# Patient Record
Sex: FEMALE | Race: WHITE | NOT HISPANIC OR LATINO | Employment: FULL TIME | URBAN - METROPOLITAN AREA
[De-identification: names, ages, dates, MRNs, and addresses within clinical notes are randomized per-mention and may not be internally consistent; named-entity substitution may affect disease eponyms.]

---

## 2022-10-20 ENCOUNTER — INITIAL PRENATAL (OUTPATIENT)
Dept: OBSTETRICS AND GYNECOLOGY | Facility: CLINIC | Age: 22
End: 2022-10-20

## 2022-10-20 VITALS
HEIGHT: 60 IN | WEIGHT: 123 LBS | BODY MASS INDEX: 24.15 KG/M2 | SYSTOLIC BLOOD PRESSURE: 106 MMHG | DIASTOLIC BLOOD PRESSURE: 67 MMHG

## 2022-10-20 DIAGNOSIS — B00.9 HERPES SIMPLEX TYPE 2 (HSV-2) INFECTION AFFECTING PREGNANCY, ANTEPARTUM: ICD-10-CM

## 2022-10-20 DIAGNOSIS — Z34.91 PRENATAL CARE IN FIRST TRIMESTER: ICD-10-CM

## 2022-10-20 DIAGNOSIS — Z3A.01 LESS THAN 8 WEEKS GESTATION OF PREGNANCY: Primary | ICD-10-CM

## 2022-10-20 DIAGNOSIS — O98.519 HERPES SIMPLEX TYPE 2 (HSV-2) INFECTION AFFECTING PREGNANCY, ANTEPARTUM: ICD-10-CM

## 2022-10-20 DIAGNOSIS — N89.8 VAGINAL IRRITATION: ICD-10-CM

## 2022-10-20 LAB
B-HCG UR QL: POSITIVE
EXPIRATION DATE: ABNORMAL
INTERNAL NEGATIVE CONTROL: NEGATIVE
INTERNAL POSITIVE CONTROL: POSITIVE
Lab: ABNORMAL

## 2022-10-20 PROCEDURE — 81025 URINE PREGNANCY TEST: CPT | Performed by: STUDENT IN AN ORGANIZED HEALTH CARE EDUCATION/TRAINING PROGRAM

## 2022-10-20 PROCEDURE — 99204 OFFICE O/P NEW MOD 45 MIN: CPT | Performed by: STUDENT IN AN ORGANIZED HEALTH CARE EDUCATION/TRAINING PROGRAM

## 2022-10-20 RX ORDER — PNV NO.95/FERROUS FUM/FOLIC AC 28MG-0.8MG
1 TABLET ORAL DAILY
Qty: 30 TABLET | Refills: 11 | Status: SHIPPED | OUTPATIENT
Start: 2022-10-20

## 2022-10-21 LAB
ABO GROUP BLD: ABNORMAL
AMPHETAMINES UR QL SCN: NEGATIVE NG/ML
BARBITURATES UR QL SCN: NEGATIVE NG/ML
BASOPHILS # BLD AUTO: 0.1 X10E3/UL (ref 0–0.2)
BASOPHILS NFR BLD AUTO: 1 %
BENZODIAZ UR QL SCN: NEGATIVE NG/ML
BLD GP AB SCN SERPL QL: NEGATIVE
BZE UR QL SCN: NEGATIVE NG/ML
CANNABINOIDS UR QL SCN: NEGATIVE NG/ML
CREAT UR-MCNC: 157.8 MG/DL (ref 20–300)
EOSINOPHIL # BLD AUTO: 0 X10E3/UL (ref 0–0.4)
EOSINOPHIL NFR BLD AUTO: 0 %
ERYTHROCYTE [DISTWIDTH] IN BLOOD BY AUTOMATED COUNT: 11.3 % (ref 11.7–15.4)
HBV SURFACE AG SERPL QL IA: NEGATIVE
HCT VFR BLD AUTO: 36 % (ref 34–46.6)
HCV AB S/CO SERPL IA: <0.1 S/CO RATIO (ref 0–0.9)
HGB BLD-MCNC: 12.8 G/DL (ref 11.1–15.9)
HIV 1+2 AB+HIV1 P24 AG SERPL QL IA: NON REACTIVE
IMM GRANULOCYTES # BLD AUTO: 0 X10E3/UL (ref 0–0.1)
IMM GRANULOCYTES NFR BLD AUTO: 0 %
LABORATORY COMMENT REPORT: NORMAL
LYMPHOCYTES # BLD AUTO: 1.9 X10E3/UL (ref 0.7–3.1)
LYMPHOCYTES NFR BLD AUTO: 20 %
MCH RBC QN AUTO: 31.6 PG (ref 26.6–33)
MCHC RBC AUTO-ENTMCNC: 35.6 G/DL (ref 31.5–35.7)
MCV RBC AUTO: 89 FL (ref 79–97)
METHADONE UR QL SCN: NEGATIVE NG/ML
MONOCYTES # BLD AUTO: 0.7 X10E3/UL (ref 0.1–0.9)
MONOCYTES NFR BLD AUTO: 7 %
NEUTROPHILS # BLD AUTO: 7.2 X10E3/UL (ref 1.4–7)
NEUTROPHILS NFR BLD AUTO: 72 %
OPIATES UR QL SCN: NEGATIVE NG/ML
OXYCODONE+OXYMORPHONE UR QL SCN: NEGATIVE NG/ML
PCP UR QL: NEGATIVE NG/ML
PH UR: 6.3 [PH] (ref 4.5–8.9)
PLATELET # BLD AUTO: 271 X10E3/UL (ref 150–450)
PROPOXYPH UR QL SCN: NEGATIVE NG/ML
RBC # BLD AUTO: 4.05 X10E6/UL (ref 3.77–5.28)
RH BLD: POSITIVE
RPR SER QL: NON REACTIVE
RUBV IGG SERPL IA-ACNC: <0.9 INDEX
WBC # BLD AUTO: 9.9 X10E3/UL (ref 3.4–10.8)

## 2022-10-22 LAB
A VAGINAE DNA VAG QL NAA+PROBE: ABNORMAL SCORE
BACTERIA UR CULT: NORMAL
BACTERIA UR CULT: NORMAL
BVAB2 DNA VAG QL NAA+PROBE: ABNORMAL SCORE
C ALBICANS DNA VAG QL NAA+PROBE: NEGATIVE
C GLABRATA DNA VAG QL NAA+PROBE: NEGATIVE
C TRACH DNA VAG QL NAA+PROBE: POSITIVE
MEGA1 DNA VAG QL NAA+PROBE: ABNORMAL SCORE
N GONORRHOEA DNA VAG QL NAA+PROBE: NEGATIVE
T VAGINALIS DNA VAG QL NAA+PROBE: NEGATIVE

## 2022-10-24 DIAGNOSIS — O98.819 CHLAMYDIA INFECTION DURING PREGNANCY: Primary | ICD-10-CM

## 2022-10-24 DIAGNOSIS — B96.89 BV (BACTERIAL VAGINOSIS): ICD-10-CM

## 2022-10-24 DIAGNOSIS — A74.9 CHLAMYDIA INFECTION DURING PREGNANCY: Primary | ICD-10-CM

## 2022-10-24 DIAGNOSIS — N76.0 BV (BACTERIAL VAGINOSIS): ICD-10-CM

## 2022-10-24 RX ORDER — METRONIDAZOLE 500 MG/1
500 TABLET ORAL 2 TIMES DAILY
Qty: 14 TABLET | Refills: 0 | Status: SHIPPED | OUTPATIENT
Start: 2022-10-24 | End: 2022-10-31

## 2022-10-24 RX ORDER — AZITHROMYCIN 500 MG/1
1000 TABLET, FILM COATED ORAL ONCE
Qty: 2 TABLET | Refills: 0 | Status: SHIPPED | OUTPATIENT
Start: 2022-10-24 | End: 2022-10-24

## 2022-10-25 ENCOUNTER — TELEPHONE (OUTPATIENT)
Dept: OBSTETRICS AND GYNECOLOGY | Facility: CLINIC | Age: 22
End: 2022-10-25

## 2022-10-25 NOTE — TELEPHONE ENCOUNTER
Spoke with Masha to let her know that Dr Turner said that your vaginal swab returned positive for chlamydia and bacterial vaginosis. Chlamydia is sexually transmitted and your partner will need to also receive treatment from his physician. You will need to abstain from sexual intercourse for 7 days following treatment of both her and her partner to insure not reinfection occurs. Treatment is with azithromycin 1000 mg once that Dr Turner sent to your [facility] at [address] [place] [address]. Bacterial vaginosis is treated with flagyl 500 mg to be taken twice a day for 7 days. Thank you.    [place] reportable disease form filled out for chlamydia and successfully faxed. Placed in basket to be scanned to chart. Thank you.

## 2022-10-25 NOTE — TELEPHONE ENCOUNTER
----- Message from Nan Turner MD sent at 10/24/2022  1:50 PM EDT -----  Please call and let her know that her vaginal swab returned positive for chlamydia and bacterial vaginosis. Chlamydia is sexually transmitted and her partner will need to also receive treatment from his physician. She will need to abstain from intercourse for 7 days following treatment of both her and her partner to ensure no reinfection occurs. Treatment is with azithromycin 1000 mg once that I have sent to her pharmacy. Bacterial vaginosis is treated with flagyl 500 mg to be taken twice a day for 7 days. Thanks! Please send [place] reportable disease form for chlamydia.

## 2022-11-03 LAB
CLINICAL INFO: NORMAL
ETHNIC BACKGROUND STATED: NORMAL
GENE MUT TESTED BLD/T: NORMAL
GENETIC COUNSELOR:: NORMAL
LAB DIRECTOR NAME PROVIDER: NORMAL
MOL DX INTERP BLD/T QL: NORMAL
REASON FOR REFERRAL (NARRATIVE): NORMAL
REF LAB TEST METHOD: NORMAL
SERVICE CMNT 02-IMP: NORMAL
SPECIMEN SOURCE: NORMAL
TEST PERFORMANCE INFO SPEC: NORMAL

## 2022-11-12 PROBLEM — O98.519 HSV-2 INFECTION COMPLICATING PREGNANCY: Status: ACTIVE | Noted: 2022-11-12

## 2022-11-12 PROBLEM — B00.9 HSV-2 INFECTION COMPLICATING PREGNANCY: Status: ACTIVE | Noted: 2022-11-12

## 2022-11-12 PROBLEM — Z34.90 PREGNANCY: Status: ACTIVE | Noted: 2022-11-12

## 2022-11-17 ENCOUNTER — ROUTINE PRENATAL (OUTPATIENT)
Dept: OBSTETRICS AND GYNECOLOGY | Facility: CLINIC | Age: 22
End: 2022-11-17

## 2022-11-17 VITALS — BODY MASS INDEX: 24.22 KG/M2 | DIASTOLIC BLOOD PRESSURE: 66 MMHG | SYSTOLIC BLOOD PRESSURE: 108 MMHG | WEIGHT: 124 LBS

## 2022-11-17 DIAGNOSIS — Z3A.10 10 WEEKS GESTATION OF PREGNANCY: Primary | ICD-10-CM

## 2022-11-17 DIAGNOSIS — Z34.91 PRENATAL CARE IN FIRST TRIMESTER: ICD-10-CM

## 2022-11-17 LAB
GLUCOSE UR STRIP-MCNC: NEGATIVE MG/DL
PROT UR STRIP-MCNC: ABNORMAL MG/DL

## 2022-11-17 PROCEDURE — 99212 OFFICE O/P EST SF 10 MIN: CPT | Performed by: STUDENT IN AN ORGANIZED HEALTH CARE EDUCATION/TRAINING PROGRAM

## 2022-11-17 NOTE — PROGRESS NOTES
Chief Complaint   Patient presents with   • Routine Prenatal Visit      Masha Shafer is a 22 y.o.  at 10w5d who presents for routine prenatal visit. She reports doing well but is still having nausea but has not had vomited in 4 days. Denies vaginal bleeding or abdominal cramping. Too early for fetal movement.     /66   Wt 56.2 kg (124 lb)   LMP 2022   BMI 24.22 kg/m²    Gen: well appearing, NAD   Abd: gravid, nontender  See OB Flowsheet    ASSESSMENT:   1. IUP at 10w5d   2. Prenatal care in first trimester  3. HSV-2 positive     PLAN:  See updated Problem List   Reviewed expectations of this stage of pregnancy.   First trimester bleeding/pain precautions reviewed.  Patient desires aneuploidy screening and [id number] testing ordered today.   Return in about 4 weeks (around 12/15/2022) for prenatal visit .    Nan Turner MD

## 2022-11-22 LAB
CFDNA.FET/CFDNA.TOTAL SFR FETUS: NORMAL %
CITATION REF LAB TEST: NORMAL
FET 13+18+21+X+Y ANEUP PLAS.CFDNA: NEGATIVE
FET CHR 21 TS PLAS.CFDNA QL: NEGATIVE
FET SEX PLAS.CFDNA DOSAGE CFDNA: NORMAL
FET TS 13 RISK PLAS.CFDNA QL: NEGATIVE
FET TS 18 RISK WBC.DNA+CFDNA QL: NEGATIVE
GA EST FROM CONCEPTION DATE: NORMAL D
GESTATIONAL AGE > 9:: YES
LAB DIRECTOR NAME PROVIDER: NORMAL
LAB DIRECTOR NAME PROVIDER: NORMAL
LABORATORY COMMENT REPORT: NORMAL
LIMITATIONS OF THE TEST: NORMAL
NEGATIVE PREDICTIVE VALUE: NORMAL
NOTE: NORMAL
PERFORMANCE CHARACTERISTICS: NORMAL
POSITIVE PREDICTIVE VALUE: NORMAL
REF LAB TEST METHOD: NORMAL
TEST PERFORMANCE INFO SPEC: NORMAL

## 2022-12-15 ENCOUNTER — ROUTINE PRENATAL (OUTPATIENT)
Dept: OBSTETRICS AND GYNECOLOGY | Facility: CLINIC | Age: 22
End: 2022-12-15
Payer: COMMERCIAL

## 2022-12-15 VITALS — WEIGHT: 127 LBS | DIASTOLIC BLOOD PRESSURE: 70 MMHG | BODY MASS INDEX: 24.8 KG/M2 | SYSTOLIC BLOOD PRESSURE: 115 MMHG

## 2022-12-15 DIAGNOSIS — Z36.86 ENCOUNTER FOR ANTENATAL SCREENING FOR CERVICAL LENGTH: ICD-10-CM

## 2022-12-15 DIAGNOSIS — Z36.89 ENCOUNTER FOR FETAL ANATOMIC SURVEY: ICD-10-CM

## 2022-12-15 DIAGNOSIS — B96.89 BV (BACTERIAL VAGINOSIS): ICD-10-CM

## 2022-12-15 DIAGNOSIS — N76.0 BV (BACTERIAL VAGINOSIS): ICD-10-CM

## 2022-12-15 DIAGNOSIS — Z3A.14 14 WEEKS GESTATION OF PREGNANCY: Primary | ICD-10-CM

## 2022-12-15 DIAGNOSIS — Z34.92 PRENATAL CARE IN SECOND TRIMESTER: ICD-10-CM

## 2022-12-15 DIAGNOSIS — Z86.19 HISTORY OF CHLAMYDIA: ICD-10-CM

## 2022-12-15 LAB
GLUCOSE UR STRIP-MCNC: NEGATIVE MG/DL
PROT UR STRIP-MCNC: ABNORMAL MG/DL

## 2022-12-15 PROCEDURE — 99213 OFFICE O/P EST LOW 20 MIN: CPT | Performed by: STUDENT IN AN ORGANIZED HEALTH CARE EDUCATION/TRAINING PROGRAM

## 2022-12-15 RX ORDER — METRONIDAZOLE 7.5 MG/G
GEL VAGINAL DAILY
Qty: 70 G | Refills: 0 | Status: SHIPPED | OUTPATIENT
Start: 2022-12-15 | End: 2022-12-20

## 2022-12-15 NOTE — PROGRESS NOTES
Chief Complaint   Patient presents with   • Routine Prenatal Visit      Masha Shafer is a 22 y.o.  at 14w5d who presents for routine prenatal visit. She reports she has been having headaches for which she has not yet tried medication. She is also still having vaginal discharge and irritation. Denies abdominal cramping or vaginal bleeding. Too early for fetal movement.     /70   Wt 57.6 kg (127 lb)   LMP 2022   BMI 24.80 kg/m²    Gen: well appearing, NAD   Abd: gravid, nontender  See OB Flowsheet    ASSESSMENT:   1. IUP at 14w5d   2. Prenatal care in second trimester  3. HSV-2 infection  4. History of chlamydia infection  5. Vaginal irritation - likely BV     PLAN:  Problem list reviewed and updated.   Reviewed expectations of this stage of pregnancy.   Second trimester precautions reviewed including  labor precautions, anticipated fetal movements.   Collected GC/CT/Trich probe for test of cure.  Will prescribe Metronidazole 0.75% gel to be inserted vaginally nightly for 5 days for treatment of BV.   Will plan for anatomy survey and cervical length screening at next visit.   Return in about 5 weeks (around 2023) for prenatal visit .     Patient Active Problem List    Diagnosis Date Noted   • Pregnancy 2022   • HSV-2 infection complicating pregnancy 2022       Orders Placed This Encounter   Procedures   • Chlamydia trachomatis, Neisseria gonorrhoeae, Trichomonas vaginalis, PCR - Urine, Urine, Clean Catch     Order Specific Question:   Release to patient     Answer:   Routine Release     Order Specific Question:   [facility] Has the patient fasted?     Answer:   No   • US Ob 14 + Weeks Single or First Gestation     Standing Status:   Future     Standing Expiration Date:   2024     Order Specific Question:   Reason for Exam:     Answer:   anatomy survey     Order Specific Question:   Release to patient     Answer:   Routine Release   • US Ob Transvaginal     Standing  Status:   Future     Standing Expiration Date:   1/2/2024     Order Specific Question:   Reason for Exam:     Answer:   cervical length screening   • POC Urinalysis Dipstick     This is an external result entered through the Results Console.     Order Specific Question:   Release to patient     Answer:   Routine Release     Nan Turner MD

## 2022-12-19 LAB
C TRACH RRNA SPEC QL NAA+PROBE: NEGATIVE
N GONORRHOEA RRNA SPEC QL NAA+PROBE: NEGATIVE
T VAGINALIS RRNA SPEC QL NAA+PROBE: NEGATIVE

## 2023-01-17 ENCOUNTER — ROUTINE PRENATAL (OUTPATIENT)
Dept: OBSTETRICS AND GYNECOLOGY | Facility: CLINIC | Age: 23
End: 2023-01-17
Payer: COMMERCIAL

## 2023-01-17 VITALS — BODY MASS INDEX: 25.78 KG/M2 | WEIGHT: 132 LBS | DIASTOLIC BLOOD PRESSURE: 72 MMHG | SYSTOLIC BLOOD PRESSURE: 109 MMHG

## 2023-01-17 DIAGNOSIS — Z34.92 PRENATAL CARE IN SECOND TRIMESTER: ICD-10-CM

## 2023-01-17 DIAGNOSIS — K59.00 CONSTIPATION, UNSPECIFIED CONSTIPATION TYPE: ICD-10-CM

## 2023-01-17 DIAGNOSIS — Z3A.19 19 WEEKS GESTATION OF PREGNANCY: Primary | ICD-10-CM

## 2023-01-17 LAB
GLUCOSE UR STRIP-MCNC: NEGATIVE MG/DL
PROT UR STRIP-MCNC: NEGATIVE MG/DL

## 2023-01-17 PROCEDURE — 99212 OFFICE O/P EST SF 10 MIN: CPT | Performed by: STUDENT IN AN ORGANIZED HEALTH CARE EDUCATION/TRAINING PROGRAM

## 2023-01-17 NOTE — PROGRESS NOTES
Chief Complaint   Patient presents with   • Routine Prenatal Visit      Masha Shafer is a 22 y.o.  at 19w3d who presents for routine prenatal visit. She reports doing well and has been having constipation. She states that she is normally constipated outside of pregnancy and maybe goes once a week. It is a little worse now that she is pregnant. Denies vaginal bleeding, cramping, contractions, LOF. She reports active fetal movement.     /72   Wt 59.9 kg (132 lb)   LMP 2022   BMI 25.78 kg/m²    Gen: well appearing, NAD   Abd: gravid, nontender  See OB Flowsheet    ASSESSMENT:   1. IUP at 19w3d   2. Prenatal care in second trimester  3. HSV-2 infection- no recent outbreaks, will need suppression therapy at 36 weeks   4. History of chlamydia infection- negative DELICIA on 22   5. Constipation      PLAN:  Problem list reviewed and updated  Reviewed expectations of this stage of pregnancy.   Second trimester precautions reviewed including  labor precautions, anticipated fetal movements.   Discussed normal anatomy survey results and normal cervical length screening with the patient today.   Recommended the patient start a stool softener (colace) that she can get over the counter and increase fiber in her diet to help reduce constipation symptoms. She may also consider Miralax daily.   Return in about 4 weeks (around 2023) for prenatal visit.    Patient Active Problem List    Diagnosis Date Noted   • Pregnancy 2022   • HSV-2 infection complicating pregnancy 2022       Orders Placed This Encounter   Procedures   • POC Urinalysis Dipstick     This is an external result entered through the Results Console.     Order Specific Question:   Release to patient     Answer:   Routine Release     Nan Turner MD

## 2023-02-15 ENCOUNTER — ROUTINE PRENATAL (OUTPATIENT)
Dept: OBSTETRICS AND GYNECOLOGY | Facility: CLINIC | Age: 23
End: 2023-02-15
Payer: COMMERCIAL

## 2023-02-15 VITALS — SYSTOLIC BLOOD PRESSURE: 111 MMHG | WEIGHT: 142 LBS | BODY MASS INDEX: 27.73 KG/M2 | DIASTOLIC BLOOD PRESSURE: 76 MMHG

## 2023-02-15 DIAGNOSIS — Z34.92 PRENATAL CARE IN SECOND TRIMESTER: ICD-10-CM

## 2023-02-15 DIAGNOSIS — Z3A.23 23 WEEKS GESTATION OF PREGNANCY: Primary | ICD-10-CM

## 2023-02-15 DIAGNOSIS — R07.9 CHEST PAIN, UNSPECIFIED TYPE: ICD-10-CM

## 2023-02-15 LAB
GLUCOSE UR STRIP-MCNC: NEGATIVE MG/DL
PROT UR STRIP-MCNC: NEGATIVE MG/DL

## 2023-02-15 PROCEDURE — 99213 OFFICE O/P EST LOW 20 MIN: CPT | Performed by: STUDENT IN AN ORGANIZED HEALTH CARE EDUCATION/TRAINING PROGRAM

## 2023-02-15 NOTE — PROGRESS NOTES
Chief Complaint   Patient presents with   • Routine Prenatal Visit      Masha Shafer is a 22 y.o.  at 23w4d who presents for routine prenatal visit. She reports doing well but did experiencing intermittent sharp chest pain lasting for 3 minutes episodes over 3 hours after eating Mexican last night. It did resolve on its own and she did not experience shortness of breath or other accompanying symptoms.  Denies vaginal bleeding, cramping, contractions, LOF. She reports active fetal movement.     /76   Wt 64.4 kg (142 lb)   LMP 2022   BMI 27.73 kg/m²    Gen: well appearing, NAD   Abd: gravid, nontender  See OB Flowsheet    ASSESSMENT:   1. IUP at 23w4d   2. Prenatal care in second trimester  3. HSV-2 infection- no recent outbreaks, will need suppression at 36 weeks  4. History of chlamydia infection in early pregnancy- negative DELICIA on 22  5. Chest pain     PLAN:  Problem list reviewed and updated.   Reviewed expectations of this stage of pregnancy.   Second trimester precautions reviewed including  labor precautions, anticipated fetal movements.   Will obtain 1h GTT and CBC at next visit.   Addressed excessive weight gain over last 4 weeks of 10 lbs and recommended total of 25-35 lbs in pregnancy. Encouraged small frequent meals and daily exercise.   Discussed that I suspect that her chest pain was related to ingestion but advised to monitor symptoms and if occurs again, contact the clinic. If worsening, she should go to the ER. Physical exam revealed CTAB, no increased work of breathing and regular rate and rhythm. Low suspicion for cardiac or pulmonary cause.   Return in about 4 weeks (around 3/15/2023) for prenatal visit and 1 h GTT .    Patient Active Problem List    Diagnosis Date Noted   • Pregnancy 2022   • HSV-2 infection complicating pregnancy 2022       Orders Placed This Encounter   Procedures   • POC Urinalysis Dipstick     This is an external result entered  through the Results Console.     Order Specific Question:   Release to patient     Answer:   Routine Release     Nan Turner MD

## 2023-03-09 ENCOUNTER — REFERRAL TRIAGE (OUTPATIENT)
Dept: LABOR AND DELIVERY | Facility: HOSPITAL | Age: 23
End: 2023-03-09
Payer: COMMERCIAL

## 2023-03-15 ENCOUNTER — PATIENT OUTREACH (OUTPATIENT)
Dept: LABOR AND DELIVERY | Facility: HOSPITAL | Age: 23
End: 2023-03-15
Payer: COMMERCIAL

## 2023-03-15 NOTE — OUTREACH NOTE
[facility]  Unable to Reach       Questions/Answers    Flowsheet Row Responses   Pending Outreach Confirm Patient Interest   Call Attempt First   Outcome Left message   Next Call Attempt Date 03/17/23          Ladonna AMOS - RN  Maternity Nurse Navigator    3/15/2023, 12:27 EDT

## 2023-03-17 ENCOUNTER — PATIENT OUTREACH (OUTPATIENT)
Dept: LABOR AND DELIVERY | Facility: HOSPITAL | Age: 23
End: 2023-03-17
Payer: COMMERCIAL

## 2023-03-17 NOTE — OUTREACH NOTE
[facility]  Unable to Reach       Questions/Answers    Flowsheet Row Responses   Pending Outreach Confirm Patient Interest   Call Attempt Second   Outcome No answer/busy, MyChart message sent to patient        UTRx2, please reach out for any specific patient needs.     Ladonna Kaiser RN  Maternity Nurse Navigator    3/17/2023, 14:32 EDT

## 2023-03-20 ENCOUNTER — ROUTINE PRENATAL (OUTPATIENT)
Dept: OBSTETRICS AND GYNECOLOGY | Facility: CLINIC | Age: 23
End: 2023-03-20
Payer: COMMERCIAL

## 2023-03-20 VITALS — DIASTOLIC BLOOD PRESSURE: 73 MMHG | WEIGHT: 154.4 LBS | BODY MASS INDEX: 30.15 KG/M2 | SYSTOLIC BLOOD PRESSURE: 116 MMHG

## 2023-03-20 DIAGNOSIS — O99.891 BACK PAIN AFFECTING PREGNANCY IN THIRD TRIMESTER: ICD-10-CM

## 2023-03-20 DIAGNOSIS — M54.9 BACK PAIN AFFECTING PREGNANCY IN THIRD TRIMESTER: ICD-10-CM

## 2023-03-20 DIAGNOSIS — O98.513 HERPES SIMPLEX VIRUS TYPE 2 (HSV-2) INFECTION AFFECTING PREGNANCY IN THIRD TRIMESTER: ICD-10-CM

## 2023-03-20 DIAGNOSIS — R10.2 PELVIC PAIN: ICD-10-CM

## 2023-03-20 DIAGNOSIS — B00.9 HERPES SIMPLEX VIRUS TYPE 2 (HSV-2) INFECTION AFFECTING PREGNANCY IN THIRD TRIMESTER: ICD-10-CM

## 2023-03-20 DIAGNOSIS — Z86.19 HISTORY OF CHLAMYDIA INFECTION: ICD-10-CM

## 2023-03-20 DIAGNOSIS — Z3A.28 28 WEEKS GESTATION OF PREGNANCY: Primary | ICD-10-CM

## 2023-03-20 LAB
GLUCOSE UR STRIP-MCNC: NEGATIVE MG/DL
PROT UR STRIP-MCNC: NEGATIVE MG/DL

## 2023-03-20 PROCEDURE — 99213 OFFICE O/P EST LOW 20 MIN: CPT | Performed by: NURSE PRACTITIONER

## 2023-03-20 NOTE — PROGRESS NOTES
Chief Complaint   Patient presents with   • Routine Prenatal Visit     28w2d OB f/u + 1 hr GTT       OB follow up     Masha Shafer is a 22 y.o.  28w2d being seen today for her obstetrical visit. Fetal movement: normal. Completing 1 hr GTT today. She reports occasional episodes of shakiness, she thinks may be hypoglycemia. C/o pain intermittently at pubic bone. C/o upper back and shoulder pain intermittently.     Review of Systems  Cramping/contractions: denies  Vaginal bleeding: denies  Fetal movement: normal    /73   Wt 70 kg (154 lb 6.4 oz)   LMP 2022   BMI 30.15 kg/m²     Assessment/Plan    Diagnoses and all orders for this visit:    1. 28 weeks gestation of pregnancy (Primary)    2. Herpes simplex virus type 2 (HSV-2) infection affecting pregnancy in third trimester    3. History of chlamydia infection    4. Pelvic pain    5. Back pain affecting pregnancy in third trimester       Reviewed fetal kick counts  Hx chlamydia: Negative DELICIA in December  Hx HSV, no current lesions, planning suppressive therapy at 36 weeks  Discussed S&S hypoglycemia, encouraged meal or snack every 3-4 hours  Completing 1 hr GTT today  Discussed pelvic pain, suggest maternity support belt. Suspect upper back and shoulder pain are R/T compensation for pain in pelvis as well. Tylenol PRN pain  Reviewed this stage of pregnancy  Problem list updated     Follow up in 2 week(s) with Dr. Turner    I spent 22 minutes caring for Masha on this date of service. This time includes time spent by me in the following activities: preparing for the visit, reviewing tests, obtaining and/or reviewing a separately obtained history, performing a medically appropriate examination and/or evaluation, counseling and educating the patient/family/caregiver, ordering medications, tests, or procedures, referring and communicating with other health care professionals and documenting information in the medical record    Joyce Foote,  APRN  3/20/2023  13:19 EDT

## 2023-03-21 RX ORDER — FERROUS SULFATE 325(65) MG
325 TABLET ORAL
Qty: 60 TABLET | Refills: 3 | Status: SHIPPED | OUTPATIENT
Start: 2023-03-21

## 2023-04-04 ENCOUNTER — ROUTINE PRENATAL (OUTPATIENT)
Dept: OBSTETRICS AND GYNECOLOGY | Facility: CLINIC | Age: 23
End: 2023-04-04
Payer: COMMERCIAL

## 2023-04-04 VITALS — BODY MASS INDEX: 30.47 KG/M2 | SYSTOLIC BLOOD PRESSURE: 130 MMHG | WEIGHT: 156 LBS | DIASTOLIC BLOOD PRESSURE: 74 MMHG

## 2023-04-04 DIAGNOSIS — O98.513 HERPES SIMPLEX VIRUS TYPE 2 (HSV-2) INFECTION AFFECTING PREGNANCY IN THIRD TRIMESTER: ICD-10-CM

## 2023-04-04 DIAGNOSIS — O99.019 MATERNAL ANEMIA IN PREGNANCY, ANTEPARTUM: ICD-10-CM

## 2023-04-04 DIAGNOSIS — Z3A.30 30 WEEKS GESTATION OF PREGNANCY: ICD-10-CM

## 2023-04-04 DIAGNOSIS — B00.9 HERPES SIMPLEX VIRUS TYPE 2 (HSV-2) INFECTION AFFECTING PREGNANCY IN THIRD TRIMESTER: ICD-10-CM

## 2023-04-04 DIAGNOSIS — Z34.83 PRENATAL CARE, SUBSEQUENT PREGNANCY IN THIRD TRIMESTER: Primary | ICD-10-CM

## 2023-04-04 LAB
GLUCOSE UR STRIP-MCNC: NEGATIVE MG/DL
PROT UR STRIP-MCNC: NEGATIVE MG/DL

## 2023-04-04 PROCEDURE — 99213 OFFICE O/P EST LOW 20 MIN: CPT | Performed by: OBSTETRICS & GYNECOLOGY

## 2023-04-04 NOTE — PROGRESS NOTES
OB follow up     Masha Shafer is a 22 y.o.  30w3d being seen today for her obstetrical visit.  Patient reports nausea. Fetal movement: normal.She denies any emesis.  She had normal weight progression over the last 2 weeks.  She will get some periodic nausea.  Normal fetal movement is noted.    Her prenatal care is complicated by (and status): History of HSV and planning suppression at 36 weeks.  Maternal anemia    Review of Systems  Cramping/contractions : Negative  Vaginal bleeding: Negative  Fetal movement normal    /74   Wt 70.8 kg (156 lb)   LMP 2022   BMI 30.47 kg/m²     FHT:  138 BPM   Uterine Size: size equals dates       Assessment    Diagnoses and all orders for this visit:    1. Prenatal care, subsequent pregnancy in third trimester (Primary)    2. 30 weeks gestation of pregnancy    3. Herpes simplex virus type 2 (HSV-2) infection affecting pregnancy in third trimester    4. Maternal anemia in pregnancy, antepartum        1) pregnancy at 30w3d         Plan    Reviewed this stage of pregnancy  Problem list updated   Follow up in 2 weeks.  Growth ultrasound on next visit.  We discussed eating more frequent bland meals to keep things on her stomach.  We discussed the group with 8 obstetricians and the call schedule.  She will discuss with Dr. Turner on her next visit the pros and cons of inducing at 39 weeks.    Francisco Mccain MD   2023  10:04 EDT

## 2023-04-18 ENCOUNTER — ROUTINE PRENATAL (OUTPATIENT)
Dept: OBSTETRICS AND GYNECOLOGY | Facility: CLINIC | Age: 23
End: 2023-04-18
Payer: COMMERCIAL

## 2023-04-18 VITALS — WEIGHT: 158 LBS | SYSTOLIC BLOOD PRESSURE: 106 MMHG | DIASTOLIC BLOOD PRESSURE: 73 MMHG | BODY MASS INDEX: 30.86 KG/M2

## 2023-04-18 DIAGNOSIS — O98.519 HERPES SIMPLEX TYPE 2 (HSV-2) INFECTION AFFECTING PREGNANCY, ANTEPARTUM: ICD-10-CM

## 2023-04-18 DIAGNOSIS — B00.9 HERPES SIMPLEX TYPE 2 (HSV-2) INFECTION AFFECTING PREGNANCY, ANTEPARTUM: ICD-10-CM

## 2023-04-18 DIAGNOSIS — Z34.93 PRENATAL CARE IN THIRD TRIMESTER: ICD-10-CM

## 2023-04-18 DIAGNOSIS — Z3A.32 32 WEEKS GESTATION OF PREGNANCY: Primary | ICD-10-CM

## 2023-04-18 DIAGNOSIS — O09.899 RUBELLA NON-IMMUNE STATUS, ANTEPARTUM: ICD-10-CM

## 2023-04-18 DIAGNOSIS — N89.8 VAGINAL ITCHING: ICD-10-CM

## 2023-04-18 DIAGNOSIS — Z28.39 RUBELLA NON-IMMUNE STATUS, ANTEPARTUM: ICD-10-CM

## 2023-04-18 DIAGNOSIS — O99.019 MATERNAL ANEMIA IN PREGNANCY, ANTEPARTUM: ICD-10-CM

## 2023-04-18 LAB
GLUCOSE UR STRIP-MCNC: NEGATIVE MG/DL
PROT UR STRIP-MCNC: ABNORMAL MG/DL

## 2023-04-18 NOTE — PROGRESS NOTES
Chief Complaint   Patient presents with   • Routine Prenatal Visit      Masha Shafer is a 22 y.o.  at 32w3d who presents for routine prenatal visit. She reports doing well but thinks she has a yeast infection and is having vaginal itching with white discharge. Denies vaginal bleeding, cramping, contractions, LOF. She reports active fetal movement.     /73   Wt 71.7 kg (158 lb)   LMP 2022   BMI 30.86 kg/m²    Gen: well appearing, NAD   Abd: gravid, nontender  See OB Flowsheet    ASSESSMENT:   1. IUP at 32w3d   2. Prenatal care in third trimester  3. Rubella non-immune status- will need postpartum vaccination  4. HSV-2 infection- no recent outbreaks, will need to start suppression therapy at 36 weeks   5. History of chlamydia infection in early pregnancy- negative DELICIA on 22  6. Maternal anemia   7. Vaginal itching     PLAN:  Problem list reviewed and updated.   Reviewed expectations of this stage of pregnancy.   Third trimester precautions reviewed including labor signs, monitoring fetal movements.   Will plan to Tdap at next visit.  Prescribed Miconazole 2% vaginal cream to be applied daily for 7 days for suspected yeast infection of the vagina causing vaginal itching.   Discussed ultrasound results that demonstrated normal fetal growth with EFW 2101 g (EFW 55%ile, AC 79%ile), normal JEFFREY 10.2 cm, and cephalic presentation.   Return in about 2 weeks (around 2023) for prenatal visit.    Patient Active Problem List    Diagnosis Date Noted   • Pregnancy 2022   • HSV-2 infection complicating pregnancy 2022       Orders Placed This Encounter   Procedures   • POC Urinalysis Dipstick     This is an external result entered through the Results Console.     Order Specific Question:   Release to patient     Answer:   Routine Release     Nan Turner MD

## 2023-04-29 ENCOUNTER — HOSPITAL ENCOUNTER (EMERGENCY)
Facility: HOSPITAL | Age: 23
Discharge: HOME | End: 2023-04-29
Attending: STUDENT IN AN ORGANIZED HEALTH CARE EDUCATION/TRAINING PROGRAM | Admitting: OBSTETRICS & GYNECOLOGY
Payer: COMMERCIAL

## 2023-04-29 VITALS
WEIGHT: 160 LBS | BODY MASS INDEX: 31.41 KG/M2 | DIASTOLIC BLOOD PRESSURE: 77 MMHG | RESPIRATION RATE: 12 BRPM | OXYGEN SATURATION: 99 % | SYSTOLIC BLOOD PRESSURE: 106 MMHG | HEIGHT: 60 IN | TEMPERATURE: 98 F | HEART RATE: 94 BPM

## 2023-04-29 DIAGNOSIS — K21.9 GASTROESOPHAGEAL REFLUX DISEASE, UNSPECIFIED WHETHER ESOPHAGITIS PRESENT: Primary | ICD-10-CM

## 2023-04-29 DIAGNOSIS — Z34.93 THIRD TRIMESTER PREGNANCY: ICD-10-CM

## 2023-04-29 LAB
ALBUMIN SERPL-MCNC: 3.4 G/DL (ref 3.5–5.2)
ALBUMIN/GLOB SERPL: 1.2 G/DL
ALP SERPL-CCNC: 107 U/L (ref 39–117)
ALT SERPL W P-5'-P-CCNC: 12 U/L (ref 1–33)
ANION GAP SERPL CALCULATED.3IONS-SCNC: 8.4 MMOL/L (ref 5–15)
AST SERPL-CCNC: 11 U/L (ref 1–32)
BASOPHILS # BLD AUTO: 0.04 10*3/MM3 (ref 0–0.2)
BASOPHILS NFR BLD AUTO: 0.4 % (ref 0–1.5)
BILIRUB SERPL-MCNC: 0.4 MG/DL (ref 0–1.2)
BUN SERPL-MCNC: 9 MG/DL (ref 6–20)
BUN/CREAT SERPL: 16.1 (ref 7–25)
CALCIUM SPEC-SCNC: 9.5 MG/DL (ref 8.6–10.5)
CHLORIDE SERPL-SCNC: 105 MMOL/L (ref 98–107)
CO2 SERPL-SCNC: 23.6 MMOL/L (ref 22–29)
CREAT SERPL-MCNC: 0.56 MG/DL (ref 0.57–1)
D DIMER PPP FEU-MCNC: 0.75 MCGFEU/ML (ref 0–0.5)
DEPRECATED RDW RBC AUTO: 44.4 FL (ref 37–54)
EGFRCR SERPLBLD CKD-EPI 2021: 131.7 ML/MIN/1.73
EOSINOPHIL # BLD AUTO: 0.19 10*3/MM3 (ref 0–0.4)
EOSINOPHIL NFR BLD AUTO: 1.7 % (ref 0.3–6.2)
ERYTHROCYTE [DISTWIDTH] IN BLOOD BY AUTOMATED COUNT: 13.5 % (ref 12.3–15.4)
GLOBULIN UR ELPH-MCNC: 2.9 GM/DL
GLUCOSE SERPL-MCNC: 88 MG/DL (ref 65–99)
HCT VFR BLD AUTO: 31.7 % (ref 34–46.6)
HGB BLD-MCNC: 11.4 G/DL (ref 12–15.9)
IMM GRANULOCYTES # BLD AUTO: 0.14 10*3/MM3 (ref 0–0.05)
IMM GRANULOCYTES NFR BLD AUTO: 1.3 % (ref 0–0.5)
LYMPHOCYTES # BLD AUTO: 2.46 10*3/MM3 (ref 0.7–3.1)
LYMPHOCYTES NFR BLD AUTO: 22.1 % (ref 19.6–45.3)
MCH RBC QN AUTO: 32.1 PG (ref 26.6–33)
MCHC RBC AUTO-ENTMCNC: 36 G/DL (ref 31.5–35.7)
MCV RBC AUTO: 89.3 FL (ref 79–97)
MONOCYTES # BLD AUTO: 0.93 10*3/MM3 (ref 0.1–0.9)
MONOCYTES NFR BLD AUTO: 8.4 % (ref 5–12)
NEUTROPHILS NFR BLD AUTO: 66.1 % (ref 42.7–76)
NEUTROPHILS NFR BLD AUTO: 7.37 10*3/MM3 (ref 1.7–7)
NRBC BLD AUTO-RTO: 0 /100 WBC (ref 0–0.2)
NT-PROBNP SERPL-MCNC: 7 PG/ML (ref 0–450)
PLATELET # BLD AUTO: 186 10*3/MM3 (ref 140–450)
PMV BLD AUTO: 10.4 FL (ref 6–12)
POTASSIUM SERPL-SCNC: 4 MMOL/L (ref 3.5–5.2)
PROT SERPL-MCNC: 6.3 G/DL (ref 6–8.5)
RBC # BLD AUTO: 3.55 10*6/MM3 (ref 3.77–5.28)
SODIUM SERPL-SCNC: 137 MMOL/L (ref 136–145)
TROPONIN T SERPL HS-MCNC: <6 NG/L
WBC NRBC COR # BLD: 11.13 10*3/MM3 (ref 3.4–10.8)

## 2023-04-29 PROCEDURE — 84484 ASSAY OF TROPONIN QUANT: CPT | Performed by: EMERGENCY MEDICINE

## 2023-04-29 PROCEDURE — 93010 ELECTROCARDIOGRAM REPORT: CPT | Performed by: INTERNAL MEDICINE

## 2023-04-29 PROCEDURE — 83880 ASSAY OF NATRIURETIC PEPTIDE: CPT | Performed by: EMERGENCY MEDICINE

## 2023-04-29 PROCEDURE — 80053 COMPREHEN METABOLIC PANEL: CPT | Performed by: EMERGENCY MEDICINE

## 2023-04-29 PROCEDURE — 93005 ELECTROCARDIOGRAM TRACING: CPT | Performed by: EMERGENCY MEDICINE

## 2023-04-29 PROCEDURE — 85025 COMPLETE CBC W/AUTO DIFF WBC: CPT | Performed by: EMERGENCY MEDICINE

## 2023-04-29 PROCEDURE — 99202 OFFICE O/P NEW SF 15 MIN: CPT

## 2023-04-29 PROCEDURE — 99283 EMERGENCY DEPT VISIT LOW MDM: CPT | Performed by: OBSTETRICS & GYNECOLOGY

## 2023-04-29 PROCEDURE — 85379 FIBRIN DEGRADATION QUANT: CPT | Performed by: EMERGENCY MEDICINE

## 2023-04-29 PROCEDURE — 59025 FETAL NON-STRESS TEST: CPT | Performed by: OBSTETRICS & GYNECOLOGY

## 2023-04-29 RX ORDER — LIDOCAINE HYDROCHLORIDE 20 MG/ML
15 SOLUTION OROPHARYNGEAL ONCE
Status: COMPLETED | OUTPATIENT
Start: 2023-04-29 | End: 2023-04-29

## 2023-04-29 RX ORDER — ALUMINA, MAGNESIA, AND SIMETHICONE 2400; 2400; 240 MG/30ML; MG/30ML; MG/30ML
15 SUSPENSION ORAL ONCE
Status: COMPLETED | OUTPATIENT
Start: 2023-04-29 | End: 2023-04-29

## 2023-04-29 RX ADMIN — LIDOCAINE HYDROCHLORIDE 15 ML: 20 SOLUTION ORAL at 22:48

## 2023-04-29 RX ADMIN — ALUMINUM HYDROXIDE, MAGNESIUM HYDROXIDE, AND DIMETHICONE 15 ML: 400; 400; 40 SUSPENSION ORAL at 22:48

## 2023-04-30 LAB — QT INTERVAL: 355 MS

## 2023-04-30 NOTE — NURSING NOTE
ED charge nurse made aware of patient's arrival to labor and delivery, chief complaint, and transfer to ER.     Patient transferred to ER in wheelchair and brought to ER triage desk.

## 2023-04-30 NOTE — ED PROVIDER NOTES
" EMERGENCY DEPARTMENT ENCOUNTER    Room Number:    Date seen:  2023  PCP: Provider, No Known  Historian: Patient      HPI:  Chief Complaint: \"I think I've got really bad indigestion\"    A complete HPI/ROS/PMH/PSH/SH/FH are unobtainable due to: N/A    Context: Masha Shafer is a 22 y.o. female who presents to the ED c/o epigastric pain rating into her chest for the past few days.  She has had some nausea.  Symptoms are worse when she is supine and when she eats or foods.  She also reports that metallic taste at times.  Mild cough/congestion which she attributes to allergies.  No fevers or chills.  No vomiting or diarrhea.  No fevers or chills.    , approximately 34 weeks pregnant.  She was seen in the OB ED prior and was cleared from their standpoint.    Additional sources:  - Discussed/ obtained information from independent historians: No    - External (non-ED) record review: She is otherwise healthy.    - Chronic or social conditions impacting care: None of which I am aware.      PAST MEDICAL HISTORY  Active Ambulatory Problems     Diagnosis Date Noted   • Pregnancy 2022   • HSV-2 infection complicating pregnancy 2022     Resolved Ambulatory Problems     Diagnosis Date Noted   • No Resolved Ambulatory Problems     Past Medical History:   Diagnosis Date   • Herpes          PAST SURGICAL HISTORY  No past surgical history on file.      FAMILY HISTORY  No family history on file.      SOCIAL HISTORY  Social History     Socioeconomic History   • Marital status: Single   Tobacco Use   • Smoking status: Never   Vaping Use   • Vaping Use: Former   • Substances: Nicotine   • Devices: Disposable   Substance and Sexual Activity   • Alcohol use: Not Currently   • Drug use: Never   • Sexual activity: Yes     Partners: Male         ALLERGIES  Patient has no known allergies.        REVIEW OF SYSTEMS  Review of Systems   Constitutional: Negative for chills and fever.   Respiratory: Positive for cough " and shortness of breath.    Cardiovascular: Positive for chest pain.   Gastrointestinal: Positive for abdominal pain and nausea. Negative for diarrhea and vomiting.            PHYSICAL EXAM  ED Triage Vitals [04/29/23 2106]   Temp Heart Rate Resp BP SpO2   97.9 °F (36.6 °C) 104 16 120/76 100 %      Temp src Heart Rate Source Patient Position BP Location FiO2 (%)   Oral Monitor -- -- --       Physical Exam      Physical Exam   Constitutional: Pt. is oriented to person, place, and time.  She is well-developed, well-nourished, and in no distress.    HENT: Normocephalic and atraumatic. Pupils are equal, round, and reactive to light.   Neck: Normal range of motion. Neck supple. No JVD present. No tracheal deviation present.   Cardiovascular: Normal rate, regular rhythm and normal heart sounds.   Pulmonary/Chest: Effort normal and breath sounds normal. No stridor. No respiratory distress. No wheezes, no rales.   Abdominal: Soft.  No distension. There is no tenderness. There is no rebound and no guarding.  Gravid uterus.  Musculoskeletal: No edema, tenderness or deformity.   Neurological: She is alert and oriented to person, place, and time.  She has no focal neurologic deficits.  Skin: Skin is warm and dry. Pt. is not diaphoretic.  Psychiatric: Mood, affect normal.  She is pleasant and cooperative.  Nursing note and vitals reviewed.            LAB RESULTS  Recent Results (from the past 24 hour(s))   Comprehensive Metabolic Panel    Collection Time: 04/29/23  9:55 PM    Specimen: Blood   Result Value Ref Range    Glucose 88 65 - 99 mg/dL    BUN 9 6 - 20 mg/dL    Creatinine 0.56 (L) 0.57 - 1.00 mg/dL    Sodium 137 136 - 145 mmol/L    Potassium 4.0 3.5 - 5.2 mmol/L    Chloride 105 98 - 107 mmol/L    CO2 23.6 22.0 - 29.0 mmol/L    Calcium 9.5 8.6 - 10.5 mg/dL    Total Protein 6.3 6.0 - 8.5 g/dL    Albumin 3.4 (L) 3.5 - 5.2 g/dL    ALT (SGPT) 12 1 - 33 U/L    AST (SGOT) 11 1 - 32 U/L    Alkaline Phosphatase 107 39 - 117 U/L     Total Bilirubin 0.4 0.0 - 1.2 mg/dL    Globulin 2.9 gm/dL    A/G Ratio 1.2 g/dL    BUN/Creatinine Ratio 16.1 7.0 - 25.0    Anion Gap 8.4 5.0 - 15.0 mmol/L    eGFR 131.7 >60.0 mL/min/1.73   BNP    Collection Time: 04/29/23  9:55 PM    Specimen: Blood   Result Value Ref Range    proBNP 7.0 0.0 - 450.0 pg/mL   Single High Sensitivity Troponin T    Collection Time: 04/29/23  9:55 PM    Specimen: Blood   Result Value Ref Range    HS Troponin T <6 <10 ng/L   D-dimer, Quantitative    Collection Time: 04/29/23  9:55 PM    Specimen: Blood   Result Value Ref Range    D-Dimer, Quantitative 0.75 (H) 0.00 - 0.50 MCGFEU/mL   CBC Auto Differential    Collection Time: 04/29/23  9:55 PM    Specimen: Blood   Result Value Ref Range    WBC 11.13 (H) 3.40 - 10.80 10*3/mm3    RBC 3.55 (L) 3.77 - 5.28 10*6/mm3    Hemoglobin 11.4 (L) 12.0 - 15.9 g/dL    Hematocrit 31.7 (L) 34.0 - 46.6 %    MCV 89.3 79.0 - 97.0 fL    MCH 32.1 26.6 - 33.0 pg    MCHC 36.0 (H) 31.5 - 35.7 g/dL    RDW 13.5 12.3 - 15.4 %    RDW-SD 44.4 37.0 - 54.0 fl    MPV 10.4 6.0 - 12.0 fL    Platelets 186 140 - 450 10*3/mm3    Neutrophil % 66.1 42.7 - 76.0 %    Lymphocyte % 22.1 19.6 - 45.3 %    Monocyte % 8.4 5.0 - 12.0 %    Eosinophil % 1.7 0.3 - 6.2 %    Basophil % 0.4 0.0 - 1.5 %    Immature Grans % 1.3 (H) 0.0 - 0.5 %    Neutrophils, Absolute 7.37 (H) 1.70 - 7.00 10*3/mm3    Lymphocytes, Absolute 2.46 0.70 - 3.10 10*3/mm3    Monocytes, Absolute 0.93 (H) 0.10 - 0.90 10*3/mm3    Eosinophils, Absolute 0.19 0.00 - 0.40 10*3/mm3    Basophils, Absolute 0.04 0.00 - 0.20 10*3/mm3    Immature Grans, Absolute 0.14 (H) 0.00 - 0.05 10*3/mm3    nRBC 0.0 0.0 - 0.2 /100 WBC   ECG 12 Lead Chest Pain    Collection Time: 04/29/23 10:07 PM   Result Value Ref Range    QT Interval 355 ms       Ordered the above labs and reviewed the results.        RADIOLOGY  No Radiology Exams Resulted Within Past 24 Hours    Ordered the above noted radiological studies. Reviewed by me in PACS.         PROCEDURES  Procedures      MEDICATIONS GIVEN IN ER  Medications   aluminum-magnesium hydroxide-simethicone (MAALOX MAX) 400-400-40 MG/5ML suspension 15 mL (15 mL Oral Given 4/29/23 2248)   Lidocaine Viscous HCl (XYLOCAINE) 2 % solution 15 mL (15 mL Mouth/Throat Given 4/29/23 2248)             MEDICAL DECISION MAKING, PROGRESS, and CONSULTS    All labs have been independently reviewed by me.  All radiology studies have been reviewed by me and discussed with radiologist dictating the report.   EKG's independently viewed and interpreted by me.  Discussion below represents my analysis of pertinent findings related to patient's condition, differential diagnosis, treatment plan and final disposition.      Orders placed during this visit:  Orders Placed This Encounter   Procedures   • Comprehensive Metabolic Panel   • BNP   • Single High Sensitivity Troponin T   • D-dimer, Quantitative   • CBC Auto Differential   • Vital Signs   • Obtain Fetal Heart Rate - For Gestational Age <24 weeks   • Continuous Uterine Monitoring - For Gestational Age >24 weeks   • Vaginal Exam   • Pulse Oximetry, Spot   • ECG 12 Lead Chest Pain   • Fetal Nonstress Test   • Transfer Patient   • CBC & Differential       Additional orders considered but not ordered:  N/A    Differential diagnosis:  Differential diagnosis for chest pain/dyspnea includes but is not limited to:  Muscle strain, costochondritis, myositis, pleurisy, rib fracture, intercostal neuritis, herpes zoster, tumor, myocardial infarction, coronary syndrome, unstable angina, angina, aortic dissection, mitral valve prolapse, pericarditis, palpitations, pulmonary embolus, pneumonia, pneumothorax, lung cancer, GERD, esophagitis, esophageal spasm      Independent interpretation of labs, radiology studies, and discussions with consultants:  ED Course as of 04/29/23 2314   Sat Apr 29, 2023 2210 EKG performed at 2207 and interpreted by me shows normal sinus rhythm without abnormality  7 bpm.  ID intervals, QRS complexes and ST-T segments are unremarkable.  No prior EKGs are available for comparison. [WC]   2219 Symptoms sound GI in nature-I have ordered a GI cocktail for her.  Check CBC, CMP, troponin, BNP and D-dimer. [WC]   2232 Pt. Has ZERO YEARS criteria, PE is ruled out given dimer < 1000ng/ml [WC]   2233 HS Troponin T: <6 [WC]   2233 proBNP: 7.0 [WC]   2311 Labs, EKG and imaging as above.  My clinical suspicion for a serious underlying pulmonary or cardiac etiology is low.  There is no evidence to suggest pulmonary embolism, aortic dissection or acute coronary syndrome.  No evidence of pneumonia/CHF/COPD exacerbation that would require inpatient admission.  The patient was advised to return to the emergency department should the symptoms worsen or for any new concerns.  The patient can be safely followed as an outpatient for further workup as indicated.   [WC]   2312 Patient feels much better after GI cocktail. [WC]      ED Course User Index  [WC] Mario Fofana MD              Appropriate PPE was worn by myself and the patient throughout our entire interaction.    DIAGNOSIS  Final diagnoses:   Gastroesophageal reflux disease, unspecified whether esophagitis present   Third trimester pregnancy         DISPOSITION  Discharged            Latest Documented Vital Signs:  As of 23:14 EDT  BP- 106/77 HR- 94 Temp- 98 °F (36.7 °C) (Tympanic) O2 sat- 99%        --    Please note that portions of this were completed with a voice recognition program.       Note Disclaimer: At [facility], we believe that sharing information builds trust and better relationships. You are receiving this note because you are receiving care at [facility] or recently visited. It is possible you will see health information before a provider has talked with you about it. This kind of information can be easy to misunderstand. To help you fully understand what it means for your health, we urge you to discuss this  note with your provider.           Mario Fofana MD  04/29/23 4586

## 2023-04-30 NOTE — OBED NOTES
[facility] Note OB        Patient Name: Masha Shafer  YOB: 2000  MRN: [id number]  Admission Date: 2023  8:53 PM  Date of Service: 2023    Chief Complaint: Chest Pain ([facility]-patient came in through [facility] with complaints of chest pain and shortness of breath that started yesterday afternoon. Patient reports mild cramping, denies LOF/vaginal bleeding, and endorses +FM. )        Subjective     Masha Shafer is a 22 y.o. female  at 34w0d with Estimated Date of Delivery: 6/10/23 who presents with the chief complaint listed above.  She sees Nan Turner MD for her prenatal care. Her pregnancy has been complicated by: HSV-2.  Patient presenting to OB ED at this time complaining of chest pain and shortness of breath that began yesterday afternoon.  She reports the pain is in the center of her chest.  She also reports feeling shortness of breath at times.  She has an occasional cramp but is not herb.  She feels normal fetal movement and denies vaginal bleeding or loss of fluid.            Objective   Patient Active Problem List    Diagnosis    • Pregnancy [Z34.90]    • HSV-2 infection complicating pregnancy [O98.519, B00.9]         OB History    Para Term  AB Living   1 0 0 0 0 0   SAB IAB Ectopic Molar Multiple Live Births   0 0 0 0 0 0      # Outcome Date GA Lbr Luis/2nd Weight Sex Delivery Anes PTL Lv   1 Current                 No past medical history on file.    No past surgical history on file.    No current facility-administered medications on file prior to encounter.     Current Outpatient Medications on File Prior to Encounter   Medication Sig Dispense Refill   • ferrous sulfate 325 (65 FE) MG tablet Take 1 tablet by mouth Daily With Breakfast. 60 tablet 3   • Prenatal Vit-Fe Fumarate-FA (Prenatal Vitamin) 27-0.8 MG tablet Take 1 tablet by mouth Daily. 30 tablet 11       No Known Allergies    No family history on file.    Social History     Socioeconomic  History   • Marital status: Single   Tobacco Use   • Smoking status: Never   Vaping Use   • Vaping Use: Former   • Substances: Nicotine   • Devices: Disposable   Substance and Sexual Activity   • Alcohol use: Not Currently   • Drug use: Never   • Sexual activity: Yes     Partners: Male           Review of Systems   Constitutional: Negative for chills, fatigue and fever.   HENT: Negative.    Eyes: Negative for photophobia and visual disturbance.   Respiratory: Positive for shortness of breath. Negative for cough and chest tightness.    Cardiovascular: Positive for chest pain. Negative for leg swelling.   Gastrointestinal: Negative for abdominal pain, diarrhea, nausea and vomiting.   Genitourinary: Negative for dysuria, flank pain, hematuria, pelvic pain, vaginal bleeding and vaginal discharge.   Musculoskeletal: Negative for back pain.   Neurological: Negative for dizziness, seizures, weakness and headaches.          PHYSICAL EXAM:      VITAL SIGNS:  Vitals:    04/29/23 2106   BP: 120/76   Pulse: 104   Resp: 16   Temp: 97.9 °F (36.6 °C)   TempSrc: Oral   SpO2: 100%        FHT'S:                   Baseline:  135-140 BPM  Variability:  Moderate = 6 - 25 BPM  Accelerations:  15 x 15 accelerations present     Decelerations:  absent  Contractions:   absent     Interpretation:   Reactive NST, CAT 1 tracing        PHYSICAL EXAM:    General: well developed; well nourished  no acute distress   Heart:  Deferred to main ED   Lungs: breathing is unlabored     Abdomen: soft, non-tender; no masses  no umbilical or inguinal hernias are present  no hepato-splenomegaly       Cervix: was not examined      Contractions: none        Extremities: peripheral pulses normal, no pedal edema, no clubbing or cyanosis      LABS AND TESTING ORDERED:  1. Uterine and fetal monitoring  2. Urinalysis  3. NST  4. Will transfer to main ER for further evaluation of chest pain    LAB RESULTS:    No results found for this or any previous visit (from the  past 24 hour(s)).    Lab Results   Component Value Date    ABO O 10/20/2022    RH Positive 10/20/2022       No results found for: STREPGPB              Assessment & Plan     ASSESSMENT/PLAN:  Masha Shafer is a 22 y.o. female  at 34w0d who presented with complaint of chest pain and feelings at times of shortness of breath over the last 30 hours.  Her oxygen saturation was noted to be 99 to 100% on room air.  Evaluation of the fetus reveals reactive NST and category 1 tracing and she feels normal fetal movement.  No loss of fluid or vaginal bleeding..  Her blood pressure is normal she denies visual disturbance,  headache, right upper quadrant pain, or increase in edema.  From an obstetrical standpoint she demonstrates no evidence of preeclampsia and is noted to have normal oxygen saturations at this time we will refer to the main ER for further evaluation of chest pain as directed by the ER physician.          Final Impression:  • Pregnancy at 34w0d  •   • Reactive NST.  CAT 1 tracing  • Chest pain of unknown etiology will will refer to main ER for further evaluation.  • Complains of shortness of breath however oxygen saturation is 99% 100% on room air  • No evidence of preeclampsia with normal blood pressure.  No headache, no visual disturbance, no right upper quadrant pain, and no significant edema.  • Maternal vital signs were reviewed and were normal               Vitals:    23 2106   BP: 120/76   Pulse: 104   Resp: 16   Temp: 97.9 °F (36.6 °C)   TempSrc: Oral   SpO2: 100%   •       • No results found for: STREPGPB  Lab Results   Component Value Date    ABO O 10/20/2022    RH Positive 10/20/2022   •   PLAN:  · Transfer to main ER for further evaluation of chest pain        I have spent 20 minutes including face to face time with the patient, greater than 50% in discussion of the diagnosis (counseling) and/or coordination of care.     Darin Kaplan MD  2023  21:15 EDT  OB  Hospitalist  Phone:    [phone]

## 2023-05-02 ENCOUNTER — ROUTINE PRENATAL (OUTPATIENT)
Dept: OBSTETRICS AND GYNECOLOGY | Facility: CLINIC | Age: 23
End: 2023-05-02
Payer: COMMERCIAL

## 2023-05-02 VITALS — SYSTOLIC BLOOD PRESSURE: 110 MMHG | DIASTOLIC BLOOD PRESSURE: 74 MMHG | WEIGHT: 159 LBS | BODY MASS INDEX: 31.05 KG/M2

## 2023-05-02 DIAGNOSIS — O99.019 MATERNAL ANEMIA IN PREGNANCY, ANTEPARTUM: ICD-10-CM

## 2023-05-02 DIAGNOSIS — O09.899 RUBELLA NON-IMMUNE STATUS, ANTEPARTUM: ICD-10-CM

## 2023-05-02 DIAGNOSIS — K30 INDIGESTION: ICD-10-CM

## 2023-05-02 DIAGNOSIS — B00.9 HERPES SIMPLEX TYPE 2 (HSV-2) INFECTION AFFECTING PREGNANCY, ANTEPARTUM: ICD-10-CM

## 2023-05-02 DIAGNOSIS — Z28.39 RUBELLA NON-IMMUNE STATUS, ANTEPARTUM: ICD-10-CM

## 2023-05-02 DIAGNOSIS — Z3A.34 34 WEEKS GESTATION OF PREGNANCY: Primary | ICD-10-CM

## 2023-05-02 DIAGNOSIS — O98.519 HERPES SIMPLEX TYPE 2 (HSV-2) INFECTION AFFECTING PREGNANCY, ANTEPARTUM: ICD-10-CM

## 2023-05-02 DIAGNOSIS — Z34.93 PRENATAL CARE IN THIRD TRIMESTER: ICD-10-CM

## 2023-05-02 LAB
GLUCOSE UR STRIP-MCNC: NEGATIVE MG/DL
PROT UR STRIP-MCNC: NEGATIVE MG/DL

## 2023-05-02 RX ORDER — PANTOPRAZOLE SODIUM 20 MG/1
20 TABLET, DELAYED RELEASE ORAL DAILY
Qty: 30 TABLET | Refills: 1 | Status: SHIPPED | OUTPATIENT
Start: 2023-05-02 | End: 2024-05-01

## 2023-05-02 RX ORDER — ACYCLOVIR 400 MG/1
400 TABLET ORAL 3 TIMES DAILY
Qty: 90 TABLET | Refills: 1 | Status: SHIPPED | OUTPATIENT
Start: 2023-05-02 | End: 2023-06-01

## 2023-05-02 NOTE — PROGRESS NOTES
Chief Complaint   Patient presents with   • Routine Prenatal Visit      Masha Shafer is a 22 y.o.  at 34w3d who presents for routine prenatal visit. She was recently seen in the ED on 23 for chest pain that was worked up and showed no concern for cardiac or pulmonary nature and improved with GI cocktail. She reports that it has been better but she again had stomach and chest pain yesterday that was more mild than what brought her to the ED. She also reports recent cold last week with congestion, running nose, ear pain bilaterally, and fluid in her ears that has been improving. Denies vaginal bleeding, cramping, contractions, LOF. She reports active fetal movement.     /74   Wt 72.1 kg (159 lb)   LMP 2022   BMI 31.05 kg/m²    Gen: well appearing, NAD   Abd: gravid, nontender  See OB Flowsheet    ASSESSMENT:   1. IUP at 34w3d   2. Prenatal care in third trimester  3. Rubella non-immune status  4. HSV-2 infection- no recent outbreaks, will prescribe Acyclovir 400 mg TID to start at 36 weeks  5. History of chlamydia infection in early pregnancy with negative DELICIA  6. Maternal anemia- recent Hgb 11.4 on 23   7. Indigestion     PLAN:  Problem list reviewed and updated.   Reviewed expectations of this stage of pregnancy.   Third trimester precautions reviewed including labor signs, monitoring fetal movements.   Will obtain GBS swab at next visit.   Prescribed Protonix 20 mg daily for management of indigestion symptoms.   Recommend OTC zyrtec, Allegra, Claritin, benadryl, Mucinex for management of cold and allergy symptoms.    Return in about 2 weeks (around 2023) for prenatal visit .    Patient Active Problem List    Diagnosis Date Noted   • Pregnancy 2022   • HSV-2 infection complicating pregnancy 2022       Orders Placed This Encounter   Procedures   • POC Urinalysis Dipstick     This is an external result entered through the Results Console.     Order Specific Question:    Release to patient     Answer:   Routine Release     Nan Turner MD

## 2023-05-16 ENCOUNTER — ROUTINE PRENATAL (OUTPATIENT)
Dept: OBSTETRICS AND GYNECOLOGY | Facility: CLINIC | Age: 23
End: 2023-05-16

## 2023-05-16 VITALS — SYSTOLIC BLOOD PRESSURE: 118 MMHG | WEIGHT: 162 LBS | DIASTOLIC BLOOD PRESSURE: 79 MMHG | BODY MASS INDEX: 31.64 KG/M2

## 2023-05-16 DIAGNOSIS — O09.899 RUBELLA NON-IMMUNE STATUS, ANTEPARTUM: ICD-10-CM

## 2023-05-16 DIAGNOSIS — O99.019 MATERNAL ANEMIA IN PREGNANCY, ANTEPARTUM: ICD-10-CM

## 2023-05-16 DIAGNOSIS — N89.8 VAGINAL DISCHARGE DURING PREGNANCY IN THIRD TRIMESTER: ICD-10-CM

## 2023-05-16 DIAGNOSIS — Z28.39 RUBELLA NON-IMMUNE STATUS, ANTEPARTUM: ICD-10-CM

## 2023-05-16 DIAGNOSIS — B00.9 HERPES SIMPLEX TYPE 2 (HSV-2) INFECTION AFFECTING PREGNANCY, ANTEPARTUM: ICD-10-CM

## 2023-05-16 DIAGNOSIS — Z3A.36 36 WEEKS GESTATION OF PREGNANCY: Primary | ICD-10-CM

## 2023-05-16 DIAGNOSIS — O98.519 HERPES SIMPLEX TYPE 2 (HSV-2) INFECTION AFFECTING PREGNANCY, ANTEPARTUM: ICD-10-CM

## 2023-05-16 DIAGNOSIS — O26.893 VAGINAL DISCHARGE DURING PREGNANCY IN THIRD TRIMESTER: ICD-10-CM

## 2023-05-16 DIAGNOSIS — Z34.93 PRENATAL CARE IN THIRD TRIMESTER: ICD-10-CM

## 2023-05-16 LAB
GLUCOSE UR STRIP-MCNC: NEGATIVE MG/DL
PROT UR STRIP-MCNC: NEGATIVE MG/DL

## 2023-05-16 NOTE — PROGRESS NOTES
Chief Complaint   Patient presents with   • Routine Prenatal Visit      Masha Shafer is a 22 y.o.  at 36w3d who presents for routine prenatal visit. She reports having irritating, vaginal discharge for about a week and there is an area of irritation on the outside on the left side. Denies burning in the area.  Denies vaginal bleeding, cramping, contractions, LOF. She reports active fetal movement.     /79   Wt 73.5 kg (162 lb)   LMP 2022   BMI 31.64 kg/m²    Gen: well appearing, NAD   Abd: gravid, nontender  Pelvis: normal external female genitalia, normal vulva with slight erythema lateral to the clitoral cifuentes on the left labia minora, no evidence of herpetic lesions, SVE 0/20/-3   See OB Flowsheet    ASSESSMENT:   1. IUP at 36w3d   2. Prenatal care in third trimester  3. Rubella non-immune status- will need postpartum vaccination   4. HSV-2 infection- no recent outbreaks, on Acyclovir 400 mg TID for suppression therapy   5. History of chlamydia infection in early pregnancy with negative DELICIA  6. Maternal anemia- recent Hgb 11.4 on 23  7. Vaginal discharge     PLAN:  Problem list reviewed and updated.   Reviewed expectations of this stage of pregnancy.   Third trimester precautions reviewed including labor signs, monitoring fetal movements.   Collected NuSwab BV/Candida to evaluate vaginal discharge.  Collected GBS swab today.   Return in about 1 week (around 2023) for prenatal visit.    Patient Active Problem List    Diagnosis Date Noted   • Pregnancy 2022   • HSV-2 infection complicating pregnancy 2022       Orders Placed This Encounter   Procedures   • Group B Streptococcus Culture - Swab, Vaginal/Rectum     Order Specific Question:   Release to patient     Answer:   Routine Release   • NuSwab BV & Candida - Swab, Vagina     Order Specific Question:   Release to patient     Answer:   Routine Release   • POC Urinalysis Dipstick     This is an external result entered  through the Results Console.     Order Specific Question:   Release to patient     Answer:   Routine Release     Nan Turner MD

## 2023-05-18 LAB
A VAGINAE DNA VAG QL NAA+PROBE: ABNORMAL SCORE
BVAB2 DNA VAG QL NAA+PROBE: ABNORMAL SCORE
C ALBICANS DNA VAG QL NAA+PROBE: NEGATIVE
C GLABRATA DNA VAG QL NAA+PROBE: NEGATIVE
MEGA1 DNA VAG QL NAA+PROBE: ABNORMAL SCORE

## 2023-05-19 DIAGNOSIS — N76.0 BV (BACTERIAL VAGINOSIS): Primary | ICD-10-CM

## 2023-05-19 DIAGNOSIS — B96.89 BV (BACTERIAL VAGINOSIS): Primary | ICD-10-CM

## 2023-05-20 LAB — B-HEM STREP SPEC QL CULT: NEGATIVE

## 2023-05-23 ENCOUNTER — ROUTINE PRENATAL (OUTPATIENT)
Dept: OBSTETRICS AND GYNECOLOGY | Facility: CLINIC | Age: 23
End: 2023-05-23
Payer: COMMERCIAL

## 2023-05-23 VITALS — DIASTOLIC BLOOD PRESSURE: 75 MMHG | WEIGHT: 164 LBS | BODY MASS INDEX: 32.03 KG/M2 | SYSTOLIC BLOOD PRESSURE: 119 MMHG

## 2023-05-23 DIAGNOSIS — Z3A.37 37 WEEKS GESTATION OF PREGNANCY: Primary | ICD-10-CM

## 2023-05-23 DIAGNOSIS — O99.019 MATERNAL ANEMIA IN PREGNANCY, ANTEPARTUM: ICD-10-CM

## 2023-05-23 DIAGNOSIS — E66.9 OBESITY (BMI 30-39.9): ICD-10-CM

## 2023-05-23 DIAGNOSIS — Z34.93 PRENATAL CARE IN THIRD TRIMESTER: ICD-10-CM

## 2023-05-23 DIAGNOSIS — O98.519 HERPES SIMPLEX TYPE 2 (HSV-2) INFECTION AFFECTING PREGNANCY, ANTEPARTUM: ICD-10-CM

## 2023-05-23 DIAGNOSIS — B00.9 HERPES SIMPLEX TYPE 2 (HSV-2) INFECTION AFFECTING PREGNANCY, ANTEPARTUM: ICD-10-CM

## 2023-05-23 DIAGNOSIS — Z28.39 RUBELLA NON-IMMUNE STATUS, ANTEPARTUM: ICD-10-CM

## 2023-05-23 DIAGNOSIS — O09.899 RUBELLA NON-IMMUNE STATUS, ANTEPARTUM: ICD-10-CM

## 2023-05-23 LAB
GLUCOSE UR STRIP-MCNC: NEGATIVE MG/DL
PROT UR STRIP-MCNC: NEGATIVE MG/DL

## 2023-05-23 NOTE — PROGRESS NOTES
Chief Complaint   Patient presents with   • Routine Prenatal Visit      Masha Shafer is a 22 y.o.  at 37w3d who presents for routine prenatal visit. She reports that her vulvar irritation on the left side has improved and she denies burning or itching in the area. She denies any HSV lesions. Denies vaginal bleeding, cramping, contractions, LOF. She reports active fetal movement.     /75   Wt 74.4 kg (164 lb)   LMP 2022   BMI 32.03 kg/m²    Gen: well appearing, NAD   Abd: gravid, nontender  SVE: 0/50/-3, normal external female genitalia, normal vulva without herpetic lesions noted.   See OB Flowsheet    ASSESSMENT:   1. IUP at 37w3d   2. Prenatal care in third trimester  3. Rubella non-immune status- will need postpartum vaccination  4. HSV-2 infection- no recent outbreaks, on Acyclovir 400 mg TID for suppression therapy   5. History of chlamydia infection in early pregnancy with negative DELICIA  6. Maternal anemia- Hgb 11.4 on 23   7. Recent BV - prescribed Metronidazole 1.3% gel on 23 for treatment   8. Obesity- BMI 31    PLAN:  Problem list reviewed and updated  Reviewed expectations of this stage of pregnancy.   Third trimester precautions reviewed including labor signs, monitoring fetal movements.   Will obtain growth ultrasound at next visit given obesity and maternal anemia.   Aware of negative GBS swab.   Return in about 1 week (around 2023) for prenatal visit and growth ultrasound .    Patient Active Problem List    Diagnosis Date Noted   •  (spontaneous vaginal delivery) 2023   • Pregnant 06/10/2023   • Supervision of normal first pregnancy in third trimester 06/10/2023   • Pregnancy 2022   • HSV-2 infection complicating pregnancy 2022       Orders Placed This Encounter   Procedures   • POC Urinalysis Dipstick     Order Specific Question:   Release to patient     Answer:   Routine Release     Nan Turner MD

## 2023-05-30 ENCOUNTER — ROUTINE PRENATAL (OUTPATIENT)
Dept: OBSTETRICS AND GYNECOLOGY | Facility: CLINIC | Age: 23
End: 2023-05-30
Payer: COMMERCIAL

## 2023-05-30 VITALS — DIASTOLIC BLOOD PRESSURE: 84 MMHG | BODY MASS INDEX: 31.83 KG/M2 | SYSTOLIC BLOOD PRESSURE: 120 MMHG | WEIGHT: 163 LBS

## 2023-05-30 DIAGNOSIS — O98.519 HERPES SIMPLEX TYPE 2 (HSV-2) INFECTION AFFECTING PREGNANCY, ANTEPARTUM: ICD-10-CM

## 2023-05-30 DIAGNOSIS — E66.9 OBESITY (BMI 30-39.9): ICD-10-CM

## 2023-05-30 DIAGNOSIS — Z34.93 PRENATAL CARE IN THIRD TRIMESTER: ICD-10-CM

## 2023-05-30 DIAGNOSIS — B96.89 BV (BACTERIAL VAGINOSIS): ICD-10-CM

## 2023-05-30 DIAGNOSIS — Z3A.38 38 WEEKS GESTATION OF PREGNANCY: Primary | ICD-10-CM

## 2023-05-30 DIAGNOSIS — O09.899 RUBELLA NON-IMMUNE STATUS, ANTEPARTUM: ICD-10-CM

## 2023-05-30 DIAGNOSIS — B00.9 HERPES SIMPLEX TYPE 2 (HSV-2) INFECTION AFFECTING PREGNANCY, ANTEPARTUM: ICD-10-CM

## 2023-05-30 DIAGNOSIS — Z28.39 RUBELLA NON-IMMUNE STATUS, ANTEPARTUM: ICD-10-CM

## 2023-05-30 DIAGNOSIS — N76.0 BV (BACTERIAL VAGINOSIS): ICD-10-CM

## 2023-05-30 DIAGNOSIS — O99.019 MATERNAL ANEMIA IN PREGNANCY, ANTEPARTUM: ICD-10-CM

## 2023-05-30 LAB
GLUCOSE UR STRIP-MCNC: NEGATIVE MG/DL
PROT UR STRIP-MCNC: NEGATIVE MG/DL

## 2023-05-30 RX ORDER — METRONIDAZOLE 500 MG/1
500 TABLET ORAL 2 TIMES DAILY
Qty: 14 TABLET | Refills: 0 | Status: SHIPPED | OUTPATIENT
Start: 2023-05-30 | End: 2023-06-06

## 2023-05-30 NOTE — PROGRESS NOTES
Chief Complaint   Patient presents with   • Routine Prenatal Visit      Masha Shafer is a 22 y.o.  at 38w3d who presents for routine prenatal visit. She reports that she thinks she still has bacterial vaginosis and is requesting another prescription for treatment. She feels very tired. Denies vaginal bleeding, cramping, contractions, LOF. She reports active fetal movement.     /84   Wt 73.9 kg (163 lb)   LMP 2022   BMI 31.83 kg/m²    Gen: well appearing, NAD   Abd: gravid, nontender  See OB Flowsheet    ASSESSMENT:   1. IUP at 38w3d   2. Prenatal care in third trimester  3. Rubella non-immune status- will need postpartum vaccination   4. HSV-2 infection- no recent outbreaks, on Acyclovir 400 mg TID for suppression in therapy   5. History of chlamydia infection in early pregnancy with negative DELICIA  6. Maternal anemia- Hgb 11.4 on 23   7. Obesity- BMI 31   8. Vaginal discharge- recent BV diagnosis     PLAN:  Problem list reviewed and updated  Reviewed expectations of this stage of pregnancy.   Third trimester precautions reviewed including labor signs, monitoring fetal movements.   Discussed ultrasound results performed for obesity and maternal anemia that demonstrated normal fetal growth measuring 3391 g (58%ile, AC 68%ile), normal JEFFREY 13.5 cm, and cephalic presentation.   We discussed possibility of elective induction of labor at 39 weeks vs expectant management. She will consider and notify the office if she would like to proceed with induction of labor.   Will prescribe flagyl 500 mg BID x 7 days PO for treatment of BV.   Follow up in 1 week for prenatal visit.     Patient Active Problem List    Diagnosis Date Noted   •  (spontaneous vaginal delivery) 2023   • Pregnant 06/10/2023   • Supervision of normal first pregnancy in third trimester 06/10/2023   • Pregnancy 2022   • HSV-2 infection complicating pregnancy 2022       Orders Placed This Encounter    Procedures   • POC Urinalysis Dipstick     Order Specific Question:   Release to patient     Answer:   Routine Release     Nan Turner MD

## 2023-06-05 ENCOUNTER — TELEPHONE (OUTPATIENT)
Dept: OBSTETRICS AND GYNECOLOGY | Facility: CLINIC | Age: 23
End: 2023-06-05
Payer: COMMERCIAL

## 2023-06-05 NOTE — TELEPHONE ENCOUNTER
Masha also sent a My Chart message so I sent her a thorough message explaining the process for her.

## 2023-06-05 NOTE — TELEPHONE ENCOUNTER
Caller: Masha Shafer    Relationship to patient: Self    Best call back number: [phone]      Type of visit: INDUCTION          Additional notes:PT WOULD LIKE TO SCHEDULE HER INDUCTION PLEASE CALL

## 2023-06-06 ENCOUNTER — ROUTINE PRENATAL (OUTPATIENT)
Dept: OBSTETRICS AND GYNECOLOGY | Facility: CLINIC | Age: 23
End: 2023-06-06
Payer: COMMERCIAL

## 2023-06-06 VITALS — BODY MASS INDEX: 31.44 KG/M2 | WEIGHT: 161 LBS | DIASTOLIC BLOOD PRESSURE: 75 MMHG | SYSTOLIC BLOOD PRESSURE: 111 MMHG

## 2023-06-06 DIAGNOSIS — Z3A.39 39 WEEKS GESTATION OF PREGNANCY: Primary | ICD-10-CM

## 2023-06-06 DIAGNOSIS — Z28.39 RUBELLA NON-IMMUNE STATUS, ANTEPARTUM: ICD-10-CM

## 2023-06-06 DIAGNOSIS — Z34.93 PRENATAL CARE IN THIRD TRIMESTER: ICD-10-CM

## 2023-06-06 DIAGNOSIS — O09.899 RUBELLA NON-IMMUNE STATUS, ANTEPARTUM: ICD-10-CM

## 2023-06-06 DIAGNOSIS — O99.019 MATERNAL ANEMIA IN PREGNANCY, ANTEPARTUM: ICD-10-CM

## 2023-06-06 DIAGNOSIS — E66.9 OBESITY (BMI 30-39.9): ICD-10-CM

## 2023-06-06 DIAGNOSIS — B00.9 HERPES SIMPLEX TYPE 2 (HSV-2) INFECTION AFFECTING PREGNANCY, ANTEPARTUM: ICD-10-CM

## 2023-06-06 DIAGNOSIS — O98.519 HERPES SIMPLEX TYPE 2 (HSV-2) INFECTION AFFECTING PREGNANCY, ANTEPARTUM: ICD-10-CM

## 2023-06-06 LAB
GLUCOSE UR STRIP-MCNC: NEGATIVE MG/DL
PROT UR STRIP-MCNC: NEGATIVE MG/DL

## 2023-06-06 NOTE — PROGRESS NOTES
Chief Complaint   Patient presents with   • Routine Prenatal Visit      Masha Shafer is a 22 y.o.  at 39w3d who presents for routine prenatal visit. She reports doing okay. She has been having off and on abdominal cramping. Denies vaginal bleeding or leakage of fluid. She reports active fetal movement.     /75   Wt 73 kg (161 lb)   LMP 2022   BMI 31.44 kg/m²    Gen: well appearing, NAD   Abd: gravid, nontender  SVE: /-2   See OB Flowsheet    ASSESSMENT:   1. IUP at 39w3d   2. Prenatal care in third trimester  3. Rubella non-immune status- will need postpartum vaccination  4. 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 History of chlamydia infection in early pregnancy with negative DELICIA  6. Maternal anemia- Hgb 11.4 on 23  7. Obesity- BMI 31     PLAN:  Problem list reviewed and updated.  Reviewed expectations of this stage of pregnancy.   Third trimester precautions reviewed including labor signs, monitoring fetal movements.   The patient would like to be scheduled for induction of labor at term and we discussed arranging on 23 at 40w1d on my call day. Will notify her of time once I have contacted L&D to schedule her. Provided her with instructions today. Will plan for pitocin augmentation given her more favorable cervix. All questions and concerns answered.     Patient Active Problem List    Diagnosis Date Noted   • Pregnancy 2022   • HSV-2 infection complicating pregnancy 2022       Orders Placed This Encounter   Procedures   • POC Urinalysis Dipstick     Order Specific Question:   Release to patient     Answer:   Routine Release     Nan Turner MD

## 2023-06-10 ENCOUNTER — ANESTHESIA EVENT (OUTPATIENT)
Dept: LABOR AND DELIVERY | Facility: HOSPITAL | Age: 23
End: 2023-06-10
Payer: COMMERCIAL

## 2023-06-10 ENCOUNTER — ANESTHESIA (OUTPATIENT)
Dept: LABOR AND DELIVERY | Facility: HOSPITAL | Age: 23
End: 2023-06-10
Payer: COMMERCIAL

## 2023-06-10 ENCOUNTER — TELEPHONE (OUTPATIENT)
Dept: OBSTETRICS AND GYNECOLOGY | Facility: CLINIC | Age: 23
End: 2023-06-10
Payer: COMMERCIAL

## 2023-06-10 ENCOUNTER — HOSPITAL ENCOUNTER (INPATIENT)
Facility: HOSPITAL | Age: 23
LOS: 3 days | Discharge: HOME | End: 2023-06-13
Attending: STUDENT IN AN ORGANIZED HEALTH CARE EDUCATION/TRAINING PROGRAM | Admitting: OBSTETRICS & GYNECOLOGY
Payer: COMMERCIAL

## 2023-06-10 PROBLEM — Z34.90 PREGNANT: Status: ACTIVE | Noted: 2023-06-10

## 2023-06-10 PROBLEM — Z34.03 SUPERVISION OF NORMAL FIRST PREGNANCY IN THIRD TRIMESTER: Status: ACTIVE | Noted: 2023-06-10

## 2023-06-10 LAB
ABO GROUP BLD: NORMAL
ALBUMIN SERPL-MCNC: 3.9 G/DL (ref 3.5–5.2)
ALBUMIN/GLOB SERPL: 1.3 G/DL
ALP SERPL-CCNC: 173 U/L (ref 39–117)
ALT SERPL W P-5'-P-CCNC: 15 U/L (ref 1–33)
ANION GAP SERPL CALCULATED.3IONS-SCNC: 12 MMOL/L (ref 5–15)
AST SERPL-CCNC: 15 U/L (ref 1–32)
BASOPHILS # BLD AUTO: 0.03 10*3/MM3 (ref 0–0.2)
BASOPHILS NFR BLD AUTO: 0.2 % (ref 0–1.5)
BILIRUB SERPL-MCNC: 0.4 MG/DL (ref 0–1.2)
BLD GP AB SCN SERPL QL: NEGATIVE
BUN SERPL-MCNC: 9 MG/DL (ref 6–20)
BUN/CREAT SERPL: 15.5 (ref 7–25)
CALCIUM SPEC-SCNC: 8.9 MG/DL (ref 8.6–10.5)
CHLORIDE SERPL-SCNC: 107 MMOL/L (ref 98–107)
CO2 SERPL-SCNC: 19 MMOL/L (ref 22–29)
CREAT SERPL-MCNC: 0.58 MG/DL (ref 0.57–1)
DEPRECATED RDW RBC AUTO: 43.6 FL (ref 37–54)
EGFRCR SERPLBLD CKD-EPI 2021: 130.6 ML/MIN/1.73
EOSINOPHIL # BLD AUTO: 0.05 10*3/MM3 (ref 0–0.4)
EOSINOPHIL NFR BLD AUTO: 0.4 % (ref 0.3–6.2)
ERYTHROCYTE [DISTWIDTH] IN BLOOD BY AUTOMATED COUNT: 13.4 % (ref 12.3–15.4)
GLOBULIN UR ELPH-MCNC: 2.9 GM/DL
GLUCOSE SERPL-MCNC: 89 MG/DL (ref 65–99)
HCT VFR BLD AUTO: 37.2 % (ref 34–46.6)
HGB BLD-MCNC: 13.2 G/DL (ref 12–15.9)
IMM GRANULOCYTES # BLD AUTO: 0.07 10*3/MM3 (ref 0–0.05)
IMM GRANULOCYTES NFR BLD AUTO: 0.6 % (ref 0–0.5)
LYMPHOCYTES # BLD AUTO: 2.31 10*3/MM3 (ref 0.7–3.1)
LYMPHOCYTES NFR BLD AUTO: 18.3 % (ref 19.6–45.3)
MCH RBC QN AUTO: 31.4 PG (ref 26.6–33)
MCHC RBC AUTO-ENTMCNC: 35.5 G/DL (ref 31.5–35.7)
MCV RBC AUTO: 88.4 FL (ref 79–97)
MONOCYTES # BLD AUTO: 0.94 10*3/MM3 (ref 0.1–0.9)
MONOCYTES NFR BLD AUTO: 7.5 % (ref 5–12)
NEUTROPHILS NFR BLD AUTO: 73 % (ref 42.7–76)
NEUTROPHILS NFR BLD AUTO: 9.21 10*3/MM3 (ref 1.7–7)
NRBC BLD AUTO-RTO: 0 /100 WBC (ref 0–0.2)
PLATELET # BLD AUTO: 210 10*3/MM3 (ref 140–450)
PMV BLD AUTO: 11 FL (ref 6–12)
POTASSIUM SERPL-SCNC: 4.2 MMOL/L (ref 3.5–5.2)
PROT SERPL-MCNC: 6.8 G/DL (ref 6–8.5)
RBC # BLD AUTO: 4.21 10*6/MM3 (ref 3.77–5.28)
RH BLD: POSITIVE
SODIUM SERPL-SCNC: 138 MMOL/L (ref 136–145)
T&S EXPIRATION DATE: NORMAL
WBC NRBC COR # BLD: 12.61 10*3/MM3 (ref 3.4–10.8)

## 2023-06-10 PROCEDURE — 86901 BLOOD TYPING SEROLOGIC RH(D): CPT | Performed by: OBSTETRICS & GYNECOLOGY

## 2023-06-10 PROCEDURE — 80053 COMPREHEN METABOLIC PANEL: CPT | Performed by: OBSTETRICS & GYNECOLOGY

## 2023-06-10 PROCEDURE — 99202 OFFICE O/P NEW SF 15 MIN: CPT | Performed by: OBSTETRICS & GYNECOLOGY

## 2023-06-10 PROCEDURE — 86900 BLOOD TYPING SEROLOGIC ABO: CPT | Performed by: OBSTETRICS & GYNECOLOGY

## 2023-06-10 PROCEDURE — 86850 RBC ANTIBODY SCREEN: CPT | Performed by: OBSTETRICS & GYNECOLOGY

## 2023-06-10 PROCEDURE — C1755 CATHETER, INTRASPINAL: HCPCS | Performed by: ANESTHESIOLOGY

## 2023-06-10 PROCEDURE — 85025 COMPLETE CBC W/AUTO DIFF WBC: CPT | Performed by: OBSTETRICS & GYNECOLOGY

## 2023-06-10 RX ORDER — LIDOCAINE HYDROCHLORIDE AND EPINEPHRINE 20; 5 MG/ML; UG/ML
INJECTION, SOLUTION EPIDURAL; INFILTRATION; INTRACAUDAL; PERINEURAL AS NEEDED
Status: DISCONTINUED | OUTPATIENT
Start: 2023-06-10 | End: 2023-06-11 | Stop reason: SURG

## 2023-06-10 RX ORDER — DIPHENHYDRAMINE HYDROCHLORIDE 50 MG/ML
12.5 INJECTION INTRAMUSCULAR; INTRAVENOUS EVERY 8 HOURS PRN
Status: DISCONTINUED | OUTPATIENT
Start: 2023-06-10 | End: 2023-06-11 | Stop reason: HOSPADM

## 2023-06-10 RX ORDER — ACETAMINOPHEN 325 MG/1
650 TABLET ORAL EVERY 4 HOURS PRN
Status: DISCONTINUED | OUTPATIENT
Start: 2023-06-10 | End: 2023-06-11 | Stop reason: HOSPADM

## 2023-06-10 RX ORDER — ACYCLOVIR 400 MG/1
400 TABLET ORAL 3 TIMES DAILY
COMMUNITY
End: 2023-06-13 | Stop reason: HOSPADM

## 2023-06-10 RX ORDER — FAMOTIDINE 20 MG/1
20 TABLET, FILM COATED ORAL 2 TIMES DAILY PRN
Status: DISCONTINUED | OUTPATIENT
Start: 2023-06-10 | End: 2023-06-11 | Stop reason: HOSPADM

## 2023-06-10 RX ORDER — METRONIDAZOLE 500 MG/1
500 TABLET ORAL 3 TIMES DAILY
COMMUNITY
End: 2023-06-13 | Stop reason: HOSPADM

## 2023-06-10 RX ORDER — FENTANYL CIT 0.2 MG/100ML-ROPIV 0.2%-NACL 0.9% EPIDURAL INJ 2/0.2 MCG/ML-%
10 SOLUTION INJECTION CONTINUOUS
Status: DISCONTINUED | OUTPATIENT
Start: 2023-06-10 | End: 2023-06-11

## 2023-06-10 RX ORDER — FAMOTIDINE 10 MG/ML
20 INJECTION, SOLUTION INTRAVENOUS 2 TIMES DAILY PRN
Status: DISCONTINUED | OUTPATIENT
Start: 2023-06-10 | End: 2023-06-11 | Stop reason: HOSPADM

## 2023-06-10 RX ORDER — SODIUM CHLORIDE 0.9 % (FLUSH) 0.9 %
10 SYRINGE (ML) INJECTION AS NEEDED
Status: DISCONTINUED | OUTPATIENT
Start: 2023-06-10 | End: 2023-06-11 | Stop reason: HOSPADM

## 2023-06-10 RX ORDER — LIDOCAINE HYDROCHLORIDE 10 MG/ML
5 INJECTION, SOLUTION EPIDURAL; INFILTRATION; INTRACAUDAL; PERINEURAL AS NEEDED
Status: DISCONTINUED | OUTPATIENT
Start: 2023-06-10 | End: 2023-06-11 | Stop reason: HOSPADM

## 2023-06-10 RX ORDER — TERBUTALINE SULFATE 1 MG/ML
0.25 INJECTION, SOLUTION SUBCUTANEOUS AS NEEDED
Status: DISCONTINUED | OUTPATIENT
Start: 2023-06-10 | End: 2023-06-11 | Stop reason: HOSPADM

## 2023-06-10 RX ORDER — FENTANYL CITRATE 50 UG/ML
50 INJECTION, SOLUTION INTRAMUSCULAR; INTRAVENOUS
Status: DISCONTINUED | OUTPATIENT
Start: 2023-06-10 | End: 2023-06-11 | Stop reason: HOSPADM

## 2023-06-10 RX ORDER — EPHEDRINE SULFATE 50 MG/ML
5 INJECTION, SOLUTION INTRAVENOUS
Status: DISCONTINUED | OUTPATIENT
Start: 2023-06-10 | End: 2023-06-11 | Stop reason: HOSPADM

## 2023-06-10 RX ORDER — SODIUM CHLORIDE, SODIUM LACTATE, POTASSIUM CHLORIDE, CALCIUM CHLORIDE 600; 310; 30; 20 MG/100ML; MG/100ML; MG/100ML; MG/100ML
125 INJECTION, SOLUTION INTRAVENOUS CONTINUOUS
Status: DISCONTINUED | OUTPATIENT
Start: 2023-06-10 | End: 2023-06-11

## 2023-06-10 RX ORDER — SODIUM CHLORIDE 0.9 % (FLUSH) 0.9 %
10 SYRINGE (ML) INJECTION EVERY 12 HOURS SCHEDULED
Status: DISCONTINUED | OUTPATIENT
Start: 2023-06-10 | End: 2023-06-11 | Stop reason: HOSPADM

## 2023-06-10 RX ORDER — ONDANSETRON 2 MG/ML
4 INJECTION INTRAMUSCULAR; INTRAVENOUS ONCE AS NEEDED
Status: DISCONTINUED | OUTPATIENT
Start: 2023-06-10 | End: 2023-06-11 | Stop reason: HOSPADM

## 2023-06-10 RX ORDER — ONDANSETRON 4 MG/1
4 TABLET, FILM COATED ORAL EVERY 6 HOURS PRN
Status: DISCONTINUED | OUTPATIENT
Start: 2023-06-10 | End: 2023-06-11 | Stop reason: HOSPADM

## 2023-06-10 RX ORDER — MAGNESIUM CARB/ALUMINUM HYDROX 105-160MG
30 TABLET,CHEWABLE ORAL ONCE AS NEEDED
Status: DISCONTINUED | OUTPATIENT
Start: 2023-06-10 | End: 2023-06-11 | Stop reason: HOSPADM

## 2023-06-10 RX ORDER — OXYTOCIN/0.9 % SODIUM CHLORIDE 30/500 ML
1-20 PLASTIC BAG, INJECTION (ML) INTRAVENOUS
Status: DISCONTINUED | OUTPATIENT
Start: 2023-06-10 | End: 2023-06-11

## 2023-06-10 RX ORDER — ONDANSETRON 2 MG/ML
4 INJECTION INTRAMUSCULAR; INTRAVENOUS EVERY 6 HOURS PRN
Status: DISCONTINUED | OUTPATIENT
Start: 2023-06-10 | End: 2023-06-11 | Stop reason: HOSPADM

## 2023-06-10 RX ADMIN — SODIUM CHLORIDE, POTASSIUM CHLORIDE, SODIUM LACTATE AND CALCIUM CHLORIDE 125 ML/HR: 600; 310; 30; 20 INJECTION, SOLUTION INTRAVENOUS at 20:00

## 2023-06-10 RX ADMIN — Medication 10 ML/HR: at 20:51

## 2023-06-10 RX ADMIN — SODIUM CHLORIDE, POTASSIUM CHLORIDE, SODIUM LACTATE AND CALCIUM CHLORIDE 125 ML/HR: 600; 310; 30; 20 INJECTION, SOLUTION INTRAVENOUS at 22:05

## 2023-06-10 RX ADMIN — Medication 2 MILLI-UNITS/MIN: at 22:04

## 2023-06-10 RX ADMIN — LIDOCAINE HYDROCHLORIDE AND EPINEPHRINE 3 ML: 20; 5 INJECTION, SOLUTION EPIDURAL; INFILTRATION; INTRACAUDAL; PERINEURAL at 20:48

## 2023-06-10 NOTE — TELEPHONE ENCOUNTER
OB Answering service call.     22 y.o.  @ 40w0d   Called with loss of mucous plug and contractions since last evening every 10-30 minutes.     Discussed labor warnings,   Suggested ambulation   Come for labor (< q 4 minutes), Amniorrhexis, Dec FM.     Keep induction     Quique Berry MD  6/10/2023  11:36 EDT

## 2023-06-10 NOTE — OBED NOTES
AN[facility] Note OB        Patient Name: Masha Shafer  YOB: 2000  MRN: [id number]  Admission Date: 6/10/2023  7:26 PM  Date of Service: 6/10/2023    Chief Complaint: Contractions        Subjective     Masha Shafer is a 22 y.o. female  at 40w0d with Estimated Date of Delivery: 6/10/23 who presents with the chief complaint listed above.  She sees Nan Turner MD for her prenatal care. Her pregnancy has been complicated by:   history of genital HSV on acyclovir, rubella non-immune, anemia, chlamydia infection in early pregnancy .   Patient with painful, regular contractions.  She has been taking acyclovir and denies outbreak or prodromal symptoms.    She describes fetal movement as normal.  She denies rupture of membranes.  She denies vaginal bleeding. She is feeling contractions.          Objective   Patient Active Problem List    Diagnosis     *Pregnant [Z34.90]     Pregnancy [Z34.90]     HSV-2 infection complicating pregnancy [O98.519, B00.9]         OB History    Para Term  AB Living   1 0 0 0 0 0   SAB IAB Ectopic Molar Multiple Live Births   0 0 0 0 0 0      # Outcome Date GA Lbr Luis/2nd Weight Sex Delivery Anes PTL Lv   1 Current                 Past Medical History:   Diagnosis Date    Herpes        History reviewed. No pertinent surgical history.    No current facility-administered medications on file prior to encounter.     Current Outpatient Medications on File Prior to Encounter   Medication Sig Dispense Refill    acyclovir (ZOVIRAX) 400 MG tablet Take 1 tablet by mouth 3 (Three) Times a Day. Take no more than 5 doses a day.      ferrous sulfate 325 (65 FE) MG tablet Take 1 tablet by mouth Daily With Breakfast. 60 tablet 3    metroNIDAZOLE (FLAGYL) 500 MG tablet Take 1 tablet by mouth 3 (Three) Times a Day.      pantoprazole (Protonix) 20 MG EC tablet Take 1 tablet by mouth Daily. 30 tablet 1    Prenatal Vit-Fe Fumarate-FA (Prenatal Vitamin) 27-0.8 MG tablet Take 1  "tablet by mouth Daily. 30 tablet 11       Not on File    History reviewed. No pertinent family history.    Social History     Socioeconomic History    Marital status: Single   Tobacco Use    Smoking status: Never   Vaping Use    Vaping Use: Former    Substances: Nicotine    Devices: Disposable   Substance and Sexual Activity    Alcohol use: Not Currently    Drug use: Never    Sexual activity: Yes     Partners: Male           Review of Systems   Constitutional:  Negative for chills, fatigue and fever.   HENT:  Negative for congestion, rhinorrhea and sore throat.    Eyes:  Negative for visual disturbance.   Respiratory: Negative.     Cardiovascular: Negative.    Gastrointestinal:  Positive for abdominal pain. Negative for constipation, diarrhea, nausea and vomiting.   Genitourinary:  Negative for difficulty urinating, dyspareunia, dysuria, flank pain, frequency, genital sores, hematuria, pelvic pain, urgency, vaginal bleeding, vaginal discharge and vaginal pain.   Neurological:  Negative for dizziness, seizures, light-headedness and headaches.   Psychiatric/Behavioral:  Negative for sleep disturbance. The patient is not nervous/anxious.         PHYSICAL EXAM:      VITAL SIGNS:  Vitals:    06/10/23 1928 06/10/23 1938 06/10/23 1940 06/10/23 1947   BP:  127/80     BP Location:  Right arm     Patient Position:  Sitting     Pulse:  92 97    Resp:  20     Temp:  97.7 °F (36.5 °C)     TempSrc:  Oral     SpO2:   98%    Weight: 72.9 kg (160 lb 12.8 oz)      Height:    152.4 cm (60\")        FHT'S:                   Baseline:  135 BPM  Variability:  Moderate = 6 - 25 BPM  Accelerations:  15 x 15 accelerations present     Decelerations:  absent  Contractions:   present     Interpretation:    Reactive NST, CAT 1 tracing        PHYSICAL EXAM:    General: well developed; well nourished  no acute distress   Heart: Not performed.   Lungs  : breathing is unlabored     Abdomen: soft, non-tender; no masses  no umbilical or inguinal " "hernias are present  no hepato-splenomegaly       Cervix: was checked (by RN): 3 cm / 100 % / -1  Cervical Dilation (cm): 3  Cervical Effacement: 100%  Fetal Station: -1  Cervical Consistency: soft  Cervical Position: anterior   Contractions: regular        Extremities: peripheral pulses normal, no pedal edema, no clubbing or cyanosis      LABS AND TESTING ORDERED:  Uterine and fetal monitoring      LAB RESULTS:    No results found for this or any previous visit (from the past 24 hour(s)).    Lab Results   Component Value Date    ABO O 10/20/2022    RH Positive 10/20/2022       Lab Results   Component Value Date    STREPGPB Negative 2023                 Assessment & Plan     ASSESSMENT/PLAN:  Masha Shafer is a 22 y.o. female  at 40w0d who presented with: term labor.  Patient admitted for anticipated vaginal delivery.            Final Impression:  Pregnancy at 40w0d  Reactive NST.  CAT 1 tracing  Term labor  Maternal vital signs were reviewed and were unremarkable              Vitals:    06/10/23 1928 06/10/23 1938 06/10/23 1940 06/10/23 194   BP:  127/80     BP Location:  Right arm     Patient Position:  Sitting     Pulse:  92 97    Resp:  20     Temp:  97.7 °F (36.5 °C)     TempSrc:  Oral     SpO2:   98%    Weight: 72.9 kg (160 lb 12.8 oz)      Height:    152.4 cm (60\")       Lab Results   Component Value Date    STREPGPB Negative 2023     Lab Results   Component Value Date    ABO O 10/20/2022    RH Positive 10/20/2022     COVID - 19 status unknown      PLAN:       I have spent 30 minutes including face to face time with the patient, greater than 50% in discussion of the diagnosis (counseling) and/or coordination of care.     Jo-Ann Atkins MD  6/10/2023  19:57 EDT  OB Hospitalist  Phone:  [phone]  "

## 2023-06-11 PROCEDURE — 59410 OBSTETRICAL CARE: CPT | Performed by: OBSTETRICS & GYNECOLOGY

## 2023-06-11 RX ORDER — BISACODYL 10 MG
10 SUPPOSITORY, RECTAL RECTAL DAILY PRN
Status: DISCONTINUED | OUTPATIENT
Start: 2023-06-12 | End: 2023-06-13 | Stop reason: HOSPADM

## 2023-06-11 RX ORDER — METHYLERGONOVINE MALEATE 0.2 MG/ML
200 INJECTION INTRAVENOUS ONCE AS NEEDED
Status: DISCONTINUED | OUTPATIENT
Start: 2023-06-11 | End: 2023-06-11 | Stop reason: HOSPADM

## 2023-06-11 RX ORDER — HYDROCODONE BITARTRATE AND ACETAMINOPHEN 5; 325 MG/1; MG/1
1 TABLET ORAL EVERY 4 HOURS PRN
Status: DISCONTINUED | OUTPATIENT
Start: 2023-06-11 | End: 2023-06-13 | Stop reason: HOSPADM

## 2023-06-11 RX ORDER — ACETAMINOPHEN 325 MG/1
650 TABLET ORAL EVERY 6 HOURS PRN
Status: DISCONTINUED | OUTPATIENT
Start: 2023-06-11 | End: 2023-06-13 | Stop reason: HOSPADM

## 2023-06-11 RX ORDER — MISOPROSTOL 200 UG/1
800 TABLET ORAL AS NEEDED
Status: DISCONTINUED | OUTPATIENT
Start: 2023-06-11 | End: 2023-06-13 | Stop reason: HOSPADM

## 2023-06-11 RX ORDER — TRANEXAMIC ACID 10 MG/ML
1000 INJECTION, SOLUTION INTRAVENOUS ONCE AS NEEDED
Status: DISCONTINUED | OUTPATIENT
Start: 2023-06-11 | End: 2023-06-11

## 2023-06-11 RX ORDER — HYDROCODONE BITARTRATE AND ACETAMINOPHEN 10; 325 MG/1; MG/1
1 TABLET ORAL EVERY 4 HOURS PRN
Status: DISCONTINUED | OUTPATIENT
Start: 2023-06-11 | End: 2023-06-13 | Stop reason: HOSPADM

## 2023-06-11 RX ORDER — PANTOPRAZOLE SODIUM 20 MG/1
20 TABLET, DELAYED RELEASE ORAL DAILY
Status: DISCONTINUED | OUTPATIENT
Start: 2023-06-11 | End: 2023-06-11 | Stop reason: CLARIF

## 2023-06-11 RX ORDER — PRENATAL VIT/IRON FUM/FOLIC AC 27MG-0.8MG
1 TABLET ORAL DAILY
Status: DISCONTINUED | OUTPATIENT
Start: 2023-06-11 | End: 2023-06-13 | Stop reason: HOSPADM

## 2023-06-11 RX ORDER — SODIUM CHLORIDE 0.9 % (FLUSH) 0.9 %
1-10 SYRINGE (ML) INJECTION AS NEEDED
Status: DISCONTINUED | OUTPATIENT
Start: 2023-06-11 | End: 2023-06-13 | Stop reason: HOSPADM

## 2023-06-11 RX ORDER — DOCUSATE SODIUM 100 MG/1
100 CAPSULE, LIQUID FILLED ORAL 2 TIMES DAILY
Status: DISCONTINUED | OUTPATIENT
Start: 2023-06-11 | End: 2023-06-13 | Stop reason: HOSPADM

## 2023-06-11 RX ORDER — ERYTHROMYCIN 5 MG/G
OINTMENT OPHTHALMIC
Status: ACTIVE
Start: 2023-06-11 | End: 2023-06-11

## 2023-06-11 RX ORDER — OXYTOCIN/0.9 % SODIUM CHLORIDE 30/500 ML
125 PLASTIC BAG, INJECTION (ML) INTRAVENOUS CONTINUOUS PRN
Status: COMPLETED | OUTPATIENT
Start: 2023-06-11 | End: 2023-06-11

## 2023-06-11 RX ORDER — ACYCLOVIR 400 MG/1
400 TABLET ORAL 3 TIMES DAILY
Status: DISCONTINUED | OUTPATIENT
Start: 2023-06-11 | End: 2023-06-13 | Stop reason: HOSPADM

## 2023-06-11 RX ORDER — PANTOPRAZOLE SODIUM 40 MG/1
40 TABLET, DELAYED RELEASE ORAL
Status: DISCONTINUED | OUTPATIENT
Start: 2023-06-11 | End: 2023-06-13 | Stop reason: HOSPADM

## 2023-06-11 RX ORDER — CARBOPROST TROMETHAMINE 250 UG/ML
250 INJECTION, SOLUTION INTRAMUSCULAR
Status: DISCONTINUED | OUTPATIENT
Start: 2023-06-11 | End: 2023-06-11 | Stop reason: HOSPADM

## 2023-06-11 RX ORDER — PROMETHAZINE HYDROCHLORIDE 12.5 MG/1
12.5 TABLET ORAL EVERY 4 HOURS PRN
Status: DISCONTINUED | OUTPATIENT
Start: 2023-06-11 | End: 2023-06-13 | Stop reason: HOSPADM

## 2023-06-11 RX ORDER — IBUPROFEN 600 MG/1
600 TABLET ORAL EVERY 6 HOURS PRN
Status: DISCONTINUED | OUTPATIENT
Start: 2023-06-11 | End: 2023-06-13 | Stop reason: HOSPADM

## 2023-06-11 RX ORDER — OXYTOCIN/0.9 % SODIUM CHLORIDE 30/500 ML
250 PLASTIC BAG, INJECTION (ML) INTRAVENOUS CONTINUOUS
Status: ACTIVE | OUTPATIENT
Start: 2023-06-11 | End: 2023-06-11

## 2023-06-11 RX ORDER — FERROUS SULFATE 325(65) MG
325 TABLET ORAL
Status: DISCONTINUED | OUTPATIENT
Start: 2023-06-11 | End: 2023-06-13 | Stop reason: HOSPADM

## 2023-06-11 RX ORDER — OXYTOCIN/0.9 % SODIUM CHLORIDE 30/500 ML
999 PLASTIC BAG, INJECTION (ML) INTRAVENOUS ONCE
Status: DISCONTINUED | OUTPATIENT
Start: 2023-06-11 | End: 2023-06-11 | Stop reason: HOSPADM

## 2023-06-11 RX ORDER — MISOPROSTOL 200 UG/1
800 TABLET ORAL ONCE AS NEEDED
Status: DISCONTINUED | OUTPATIENT
Start: 2023-06-11 | End: 2023-06-11 | Stop reason: HOSPADM

## 2023-06-11 RX ORDER — PANTOPRAZOLE SODIUM 40 MG/1
40 TABLET, DELAYED RELEASE ORAL
Status: DISCONTINUED | OUTPATIENT
Start: 2023-06-11 | End: 2023-06-11 | Stop reason: SDUPTHER

## 2023-06-11 RX ORDER — HYDROXYZINE 50 MG/1
50 TABLET, FILM COATED ORAL NIGHTLY PRN
Status: DISCONTINUED | OUTPATIENT
Start: 2023-06-11 | End: 2023-06-13 | Stop reason: HOSPADM

## 2023-06-11 RX ORDER — HYDROCORTISONE 25 MG/G
CREAM TOPICAL AS NEEDED
Status: DISCONTINUED | OUTPATIENT
Start: 2023-06-11 | End: 2023-06-13 | Stop reason: HOSPADM

## 2023-06-11 RX ORDER — PHYTONADIONE 1 MG/.5ML
INJECTION, EMULSION INTRAMUSCULAR; INTRAVENOUS; SUBCUTANEOUS
Status: ACTIVE
Start: 2023-06-11 | End: 2023-06-11

## 2023-06-11 RX ADMIN — Medication 1 TABLET: at 09:53

## 2023-06-11 RX ADMIN — HYDROCODONE BITARTRATE AND ACETAMINOPHEN 1 TABLET: 10; 325 TABLET ORAL at 23:58

## 2023-06-11 RX ADMIN — FERROUS SULFATE TAB 325 MG (65 MG ELEMENTAL FE) 325 MG: 325 (65 FE) TAB at 09:53

## 2023-06-11 RX ADMIN — ACYCLOVIR 400 MG: 400 TABLET ORAL at 09:54

## 2023-06-11 RX ADMIN — ACYCLOVIR 400 MG: 400 TABLET ORAL at 16:00

## 2023-06-11 RX ADMIN — ACYCLOVIR 400 MG: 400 TABLET ORAL at 23:33

## 2023-06-11 RX ADMIN — IBUPROFEN 600 MG: 600 TABLET, FILM COATED ORAL at 05:24

## 2023-06-11 RX ADMIN — HYDROCODONE BITARTRATE AND ACETAMINOPHEN 1 TABLET: 5; 325 TABLET ORAL at 15:59

## 2023-06-11 RX ADMIN — HYDROCODONE BITARTRATE AND ACETAMINOPHEN 1 TABLET: 5; 325 TABLET ORAL at 09:52

## 2023-06-11 RX ADMIN — SODIUM CHLORIDE, POTASSIUM CHLORIDE, SODIUM LACTATE AND CALCIUM CHLORIDE 125 ML/HR: 600; 310; 30; 20 INJECTION, SOLUTION INTRAVENOUS at 01:02

## 2023-06-11 RX ADMIN — DOCUSATE SODIUM 100 MG: 100 CAPSULE, LIQUID FILLED ORAL at 09:54

## 2023-06-11 RX ADMIN — DOCUSATE SODIUM 100 MG: 100 CAPSULE, LIQUID FILLED ORAL at 20:21

## 2023-06-11 RX ADMIN — BENZOCAINE: 5.6 OINTMENT TOPICAL at 23:29

## 2023-06-11 RX ADMIN — IBUPROFEN 600 MG: 600 TABLET, FILM COATED ORAL at 12:57

## 2023-06-11 RX ADMIN — HYDROCODONE BITARTRATE AND ACETAMINOPHEN 1 TABLET: 5; 325 TABLET ORAL at 20:20

## 2023-06-11 RX ADMIN — PANTOPRAZOLE SODIUM 40 MG: 40 TABLET, DELAYED RELEASE ORAL at 12:57

## 2023-06-11 RX ADMIN — IBUPROFEN 600 MG: 600 TABLET, FILM COATED ORAL at 20:21

## 2023-06-11 RX ADMIN — Medication 125 ML/HR: at 05:27

## 2023-06-11 RX ADMIN — Medication: at 23:29

## 2023-06-11 RX ADMIN — HYDROCODONE BITARTRATE AND ACETAMINOPHEN 1 TABLET: 5; 325 TABLET ORAL at 05:24

## 2023-06-11 RX ADMIN — SODIUM CHLORIDE, POTASSIUM CHLORIDE, SODIUM LACTATE AND CALCIUM CHLORIDE 999 ML: 600; 310; 30; 20 INJECTION, SOLUTION INTRAVENOUS at 01:58

## 2023-06-11 NOTE — ANESTHESIA PROCEDURE NOTES
Labor Epidural    Pre-sedation assessment completed: 6/10/2023 8:48 PM    Patient reassessed immediately prior to procedure    Patient location during procedure: OB  Performed By  Anesthesiologist: Dre Dougherty MD  Preanesthetic Checklist  Completed: patient identified, IV checked, site marked, risks and benefits discussed, surgical consent, monitors and equipment checked, pre-op evaluation and timeout performed  Prep:  Pt Position:sitting  Sterile Tech:gloves, cap and sterile barrier  Prep:chlorhexidine gluconate and isopropyl alcohol  Monitoring:continuous pulse oximetry, EKG and blood pressure monitoring  Epidural Block Procedure:  Approach:midline  Guidance:palpation technique  Location:L2-L3  Needle Type:Tuohy  Needle Gauge:17 G  Loss of Resistance Medium: saline  Loss of Resistance: 5cm  Cath Depth at skin:10 cm  Paresthesia: none  Aspiration:negative  Test Dose:negative  Number of Attempts: 1  Post Assessment:  Dressing:secured with tape and occlusive dressing applied  Pt Tolerance:patient tolerated the procedure well with no apparent complications  Complications:no

## 2023-06-11 NOTE — ANESTHESIA PREPROCEDURE EVALUATION
Anesthesia Evaluation     Patient summary reviewed and Nursing notes reviewed   NPO Solid Status: > 8 hours             Airway   Mallampati: I  TM distance: >3 FB  Neck ROM: full  No difficulty expected  Dental - normal exam     Pulmonary - negative pulmonary ROS and normal exam   Cardiovascular - negative cardio ROS and normal exam  Exercise tolerance: good (4-7 METS)        Neuro/Psych- negative ROS  GI/Hepatic/Renal/Endo - negative ROS     Musculoskeletal (-) negative ROS    Abdominal    Substance History - negative use     OB/GYN negative ob/gyn ROS   (+) Pregnant        Other            Phys Exam Other: Pregnant              Anesthesia Plan    ASA 2     epidural     (Intrauterine pregnancy at 40w0d)    Anesthetic plan, risks, benefits, and alternatives have been provided, discussed and informed consent has been obtained with: patient.  Pre-procedure education provided    CODE STATUS:    Level Of Support Discussed With: Patient  Code Status (Patient has no pulse and is not breathing): CPR (Attempt to Resuscitate)  Medical Interventions (Patient has pulse or is breathing): Full Support  Release to patient: Routine Release

## 2023-06-11 NOTE — ANESTHESIA POSTPROCEDURE EVALUATION
Patient: Masha Shafer    Procedure Summary       Date: 06/10/23 Room / Location:     Anesthesia Start: 2038 Anesthesia Stop: 06/11/23 0400    Procedure: LABOR ANALGESIA Diagnosis:     Scheduled Providers:  Provider: Dre Dougherty MD    Anesthesia Type: epidural ASA Status: 2            Anesthesia Type: epidural    Vitals  Vitals Value Taken Time   /80 06/11/23 0706   Temp 36.8 °C (98.3 °F) 06/11/23 0706   Pulse 71 06/11/23 0706   Resp 18 06/11/23 0706   SpO2 100 % 06/11/23 0400           Post Anesthesia Care and Evaluation    Patient location during evaluation: bedside  Patient participation: complete - patient participated  Level of consciousness: awake  Pain management: adequate    Airway patency: patent  Anesthetic complications: No anesthetic complications  PONV Status: none  Cardiovascular status: acceptable  Respiratory status: acceptable  Hydration status: acceptable  Post Neuraxial Block status: Motor and sensory function returned to baseline

## 2023-06-11 NOTE — L&D DELIVERY NOTE
[facility]  Vaginal Delivery Note    Delivery    Predelivery Diagnoses: 1) Pregnancy at 40w1d                                                        Postdelivery Diagnoses 1) Pregnancy at 40w1d                                           Attending :  Quique Berry MD     Procedure : Obstetrical controlled vaginal delivery    Delivery Narrative :     Masha Shafer is a 22 y.o. year old  @ 40w1d estimated gestational age. She presented to L&D with contractions and cervical change, consistent with labor.  Her prenatal care was with Dr. Turner and was complicated by 1) Hx of HSV, no lesions or prodromal symptoms on suppression.   Once on L&D she received and epidural, had amniotomy and IUPC placed followed by some pitocin augmentation. Then we encountered some decelerations with contractions that resolved with pitocin off, fluid bolus and position change.  She progressed well into active labor and through active labor. I was notified she was complete and she started pushing. I was asked to attend as soon as complete due to some initial severe variable decelerations that quickly resolved.     After pushing for just over one hour, she was prepped in the lithotomy position, and was doing well in her pushing efforts.  With delivery of the fetal head in OA presentation, bulb suction was performed, then using a barron type maneuver, pressure was placed along the posterior aspect of the anterior shoulder and it delivered without difficulty. One nuchal cord noted, and reduced with delivery. Meconium staining noted at the time of delivery, not prior. The infant showed good cry and tone and was placed upon the Mother's abdomen. After a thirty second delay, I then clamped the cord and it was cut by the father of the baby.  Care of the infant was then turned over to the delivery team.  Cord blood was obtained and then using gentle pressure the placenta was delivered spontaneous/intact and noted to have 3 vessel cord.  At  that point I undertook inspection of the perineum and vulva and I repaired second degree perineal laceration using 3-0 Monocryl in standard fashion with good hemostatic and cosmetic results.       After repair of all lacerations, the area was noted to be hemostatic and all sponge and needle counts were correct. A vaginal exam and rectal exam showed no issues with retained equipment or suture in an abnormal place.      There were three family member(s) noted to be in the room with this patient.            Delivery: Vaginal, Spontaneous     YOB: 2024    Time of Birth: 4:00 AM      Anesthesia: Epidural             QBL: 198 cc           Infant    Findings: female  infant     Infant observations: Weight: 3280 g (7 lb 3.7 oz)   Length: 20  in  Observations/Comments:  LDR1 panda      Apgars: 7  @ 1 minute /    9  @ 5 minutes       Complications  none    Disposition  Mother to Mother Baby/Postpartum  in stable condition currently.  Baby to remains with mom  in stable condition currently.      Quique Berry MD  06/11/23  04:40 EDT

## 2023-06-11 NOTE — H&P
[facility]  Obstetric History and Physical    Contractions     Patient is a 22 y.o. female  currently at 40w0d, who presents with labor - cervical change with contractions.    Her prenatal care was with Dr. Turner and was complicated by 1) Chlamydia in early pregnancy, 2) Hx of HSV, and 3) anemia, but recent evaluations have been better.         External Prenatal Results       Pregnancy Outside Results - Transcribed From Office Records - See Scanned Records For Details       Test Value Date Time    ABO  O  10/20/22 1622    Rh  Positive  10/20/22 1622    Antibody Screen  Negative  10/20/22 1622    Varicella IgG       Rubella  <0.90 index 10/20/22 1622    Hgb  13.2 g/dL 06/10/23 2012       11.4 g/dL 23 2155       10.4 g/dL 23 1401       12.8 g/dL 10/20/22 1622    Hct  37.2 % 06/10/23 2012       31.7 % 23 2155       30.5 % 23 1401       36.0 % 10/20/22 1622    Glucose Fasting GTT       Glucose Tolerance Test 1 hour       Glucose Tolerance Test 3 hour       Gonorrhea (discrete)  Negative  22 0836       Negative  10/20/22 0413    Chlamydia (discrete)  Negative  22 0836       Positive  10/20/22 0413    RPR  Non Reactive  10/20/22 1622    VDRL       Syphilis Antibody ^ <0.2 AI 21 1542    HBsAg  Negative  10/20/22 1622    Herpes Simplex Virus PCR       Herpes Simplex VIrus Culture       HIV  Non Reactive  10/20/22 1622    Hep C RNA Quant PCR       Hep C Antibody  <0.1 s/co ratio 10/20/22 1622    AFP       Group B Strep  Negative  23 1441    GBS Susceptibility to Clindamycin       GBS Susceptibility to Erythromycin       Fetal Fibronectin       Genetic Testing, Maternal Blood                 Drug Screening       Test Value Date Time    Urine Drug Screen       Amphetamine Screen  Negative ng/mL 10/20/22 0411    Barbiturate Screen  Negative ng/mL 10/20/22 0411    Benzodiazepine Screen  Negative ng/mL 10/20/22 0411    Methadone Screen  Negative ng/mL 10/20/22 0411     Phencyclidine Screen  Negative ng/mL 10/20/22 0411    Opiates Screen       THC Screen       Cocaine Screen       Propoxyphene Screen  Negative ng/mL 10/20/22 0411    Buprenorphine Screen       Methamphetamine Screen       Oxycodone Screen       Tricyclic Antidepressants Screen                 Legend    ^: Historical                               OB History    Para Term  AB Living   1 0 0 0 0 0   SAB IAB Ectopic Molar Multiple Live Births   0 0 0 0 0 0      # Outcome Date GA Lbr Luis/2nd Weight Sex Delivery Anes PTL Lv   1 Current                    Past Medical History:   Diagnosis Date    Herpes         History reviewed. No pertinent surgical history.       No current facility-administered medications on file prior to encounter.     Current Outpatient Medications on File Prior to Encounter   Medication Sig Dispense Refill    acyclovir (ZOVIRAX) 400 MG tablet Take 1 tablet by mouth 3 (Three) Times a Day. Take no more than 5 doses a day.      ferrous sulfate 325 (65 FE) MG tablet Take 1 tablet by mouth Daily With Breakfast. 60 tablet 3    metroNIDAZOLE (FLAGYL) 500 MG tablet Take 1 tablet by mouth 3 (Three) Times a Day.      pantoprazole (Protonix) 20 MG EC tablet Take 1 tablet by mouth Daily. 30 tablet 1    Prenatal Vit-Fe Fumarate-FA (Prenatal Vitamin) 27-0.8 MG tablet Take 1 tablet by mouth Daily. 30 tablet 11        Not on File       Social History     Socioeconomic History    Marital status: Single   Tobacco Use    Smoking status: Never   Vaping Use    Vaping Use: Former    Substances: Nicotine    Devices: Disposable   Substance and Sexual Activity    Alcohol use: Not Currently    Drug use: Never    Sexual activity: Yes     Partners: Male        History reviewed. No pertinent family history.     Review of Systems   Constitutional:  Negative for fever.   Eyes:  Negative for visual disturbance.   Respiratory:  Negative for shortness of breath.    Gastrointestinal:  Positive for abdominal pain.    Genitourinary:  Positive for pelvic pain and pelvic pressure. Negative for vaginal bleeding and vaginal discharge.   Neurological:  Negative for headache.   All other systems reviewed and are negative.    Temp:  [97.7 °F (36.5 °C)] 97.7 °F (36.5 °C)  Heart Rate:  [92-99] 99  Resp:  [20] 20  BP: (127)/(80) 127/80      Physical Exam  Vitals reviewed. Exam conducted with a chaperone present.   Constitutional:       General: She is not in acute distress.     Appearance: She is well-developed and normal weight.   HENT:      Head: Normocephalic and atraumatic.   Eyes:      General:         Right eye: No discharge.         Left eye: No discharge.      Conjunctiva/sclera: Conjunctivae normal.   Neck:      Thyroid: No thyromegaly.   Cardiovascular:      Rate and Rhythm: Normal rate and regular rhythm.      Heart sounds: Normal heart sounds. No murmur heard.  Pulmonary:      Effort: Pulmonary effort is normal. No respiratory distress.      Breath sounds: Normal breath sounds.   Abdominal:      General: There is distension (EFW 8#).      Palpations: Abdomen is soft.      Tenderness: There is no abdominal tenderness.   Genitourinary:     Cervix: No cervical bleeding (3/100/-2 per admission notes).   Musculoskeletal:         General: No tenderness. Normal range of motion.      Cervical back: Normal range of motion and neck supple.      Right lower leg: Edema (Trace edema) present.      Left lower leg: Edema present.   Lymphadenopathy:      Cervical: No cervical adenopathy.   Skin:     General: Skin is warm and dry.      Findings: No rash.   Neurological:      General: No focal deficit present.      Mental Status: She is alert and oriented to person, place, and time.      Deep Tendon Reflexes: Reflexes normal.   Psychiatric:         Behavior: Behavior normal.         Thought Content: Thought content normal.         Judgment: Judgment normal.         FHT - Reassuring, class 1   [place] - q 3 minutes     Lab Results   Component  Value Date    WBC 12.61 (H) 06/10/2023    HGB 13.2 06/10/2023    HCT 37.2 06/10/2023    MCV 88.4 06/10/2023     06/10/2023         Supervision of normal first pregnancy in third trimester      Assessment:  1.  Intrauterine pregnancy at 40w0d weeks gestation with reassuring fetal status.    2.  labor  without ROM  3.  Obstetrical history significant for  Hx of HSV, no active lesions or prodromal symptoms.   4.  GBS status: .negative     Plan:  1. Vaginal anticipated, fetal and uterine monitoring  continuously and analgesia with  epidural  Augmentation if required   2. Plan of care has been reviewed with patient  3.  Risks, benefits of treatment plan have been discussed.  4.  All questions have been answered.        Quique Berry MD  6/10/2023  20:45 EDT

## 2023-06-11 NOTE — LACTATION NOTE
This note was copied from a baby's chart.  P1 term baby did not breast feed after delivery then had bath and is too sleepy to breast feed at present.  Tried hand expressing milk without success. Mom then pumped with hand pump 15 minutes each breast, no milk obtained and she is now supplementing baby with formula. Breast pump prescription faxed and encouraged to call in 2-3 hrs at next feeding for latch assistance.

## 2023-06-11 NOTE — PLAN OF CARE
Problem: Bleeding (Labor)  Goal: Hemostasis  Outcome: Met     Problem: Change in Fetal Wellbeing (Labor)  Goal: Stable Fetal Wellbeing  Outcome: Met     Problem: Delayed Labor Progression (Labor)  Goal: Effective Progression to Delivery  Outcome: Met     Problem: Infection (Labor)  Goal: Absence of Infection Signs and Symptoms  Outcome: Met     Problem: Labor Pain (Labor)  Goal: Acceptable Pain Control  Outcome: Met     Problem: Uterine Tachysystole (Labor)  Goal: Normal Uterine Contraction Pattern  Outcome: Met   Goal Outcome Evaluation:

## 2023-06-11 NOTE — PROGRESS NOTES
Postpartum Progress Note      Status post Vaginal Delivery: Doing well postoperatively.     1) postpartum care immediately following delivery :    - Continue routine postpartum care.    - CBC ordered for tomorrow morning.    - Anticipate discharge home on PPD#1-2.     2. Ingestion   - will order Protonix 40 mg PO daily.     Rh status: O positive   Rubella: Not immune, will order MMR to be received prior to discharge.   Gender: Female infant     Subjective     Postpartum Day 0: Vaginal delivery    The patient feels tired. The patient denies emotional concerns. Pain is well controlled with current medications. The baby is well. The patient is ambulating well. The patient is tolerating a normal diet. She reports her vaginal bleeding is light. She is having increased ingestion.     Objective     Vital signs in last 24 hours:  Temp:  [97.6 °F (36.4 °C)-98.3 °F (36.8 °C)] 98.3 °F (36.8 °C)  Heart Rate:  [] 71  Resp:  [18-20] 18  BP: ()/(44-88) 118/80      General:    alert, appears stated age and cooperative   Lungs:  no increased work of breathing, regular respirations    Abdomen:  Soft, Non-tender    Lochia:  appropriate   Uterine Fundus:   firm   Ext    Trace lower extremity edema    DVT Evaluation:  No evidence of DVT seen on physical exam.     Lab Results   Component Value Date    WBC 12.61 (H) 06/10/2023    HGB 13.2 06/10/2023    HCT 37.2 06/10/2023    MCV 88.4 06/10/2023     06/10/2023       Nan Turner MD  6/11/2023  11:33 EDT

## 2023-06-12 LAB
BASOPHILS # BLD AUTO: 0.05 10*3/MM3 (ref 0–0.2)
BASOPHILS NFR BLD AUTO: 0.3 % (ref 0–1.5)
DEPRECATED RDW RBC AUTO: 44 FL (ref 37–54)
EOSINOPHIL # BLD AUTO: 0.1 10*3/MM3 (ref 0–0.4)
EOSINOPHIL NFR BLD AUTO: 0.6 % (ref 0.3–6.2)
ERYTHROCYTE [DISTWIDTH] IN BLOOD BY AUTOMATED COUNT: 13.5 % (ref 12.3–15.4)
HCT VFR BLD AUTO: 31.6 % (ref 34–46.6)
HGB BLD-MCNC: 10.9 G/DL (ref 12–15.9)
IMM GRANULOCYTES # BLD AUTO: 0.1 10*3/MM3 (ref 0–0.05)
IMM GRANULOCYTES NFR BLD AUTO: 0.6 % (ref 0–0.5)
LYMPHOCYTES # BLD AUTO: 3.11 10*3/MM3 (ref 0.7–3.1)
LYMPHOCYTES NFR BLD AUTO: 19.9 % (ref 19.6–45.3)
MCH RBC QN AUTO: 31.1 PG (ref 26.6–33)
MCHC RBC AUTO-ENTMCNC: 34.5 G/DL (ref 31.5–35.7)
MCV RBC AUTO: 90.3 FL (ref 79–97)
MONOCYTES # BLD AUTO: 1.19 10*3/MM3 (ref 0.1–0.9)
MONOCYTES NFR BLD AUTO: 7.6 % (ref 5–12)
NEUTROPHILS NFR BLD AUTO: 11.08 10*3/MM3 (ref 1.7–7)
NEUTROPHILS NFR BLD AUTO: 71 % (ref 42.7–76)
NRBC BLD AUTO-RTO: 0 /100 WBC (ref 0–0.2)
PLATELET # BLD AUTO: 164 10*3/MM3 (ref 140–450)
PMV BLD AUTO: 11.2 FL (ref 6–12)
RBC # BLD AUTO: 3.5 10*6/MM3 (ref 3.77–5.28)
WBC NRBC COR # BLD: 15.63 10*3/MM3 (ref 3.4–10.8)

## 2023-06-12 PROCEDURE — 85025 COMPLETE CBC W/AUTO DIFF WBC: CPT | Performed by: OBSTETRICS & GYNECOLOGY

## 2023-06-12 RX ADMIN — ACYCLOVIR 400 MG: 400 TABLET ORAL at 20:31

## 2023-06-12 RX ADMIN — IBUPROFEN 600 MG: 600 TABLET, FILM COATED ORAL at 14:53

## 2023-06-12 RX ADMIN — HYDROCODONE BITARTRATE AND ACETAMINOPHEN 1 TABLET: 10; 325 TABLET ORAL at 08:53

## 2023-06-12 RX ADMIN — IBUPROFEN 600 MG: 600 TABLET, FILM COATED ORAL at 23:12

## 2023-06-12 RX ADMIN — DOCUSATE SODIUM 100 MG: 100 CAPSULE, LIQUID FILLED ORAL at 20:31

## 2023-06-12 RX ADMIN — PANTOPRAZOLE SODIUM 40 MG: 40 TABLET, DELAYED RELEASE ORAL at 08:54

## 2023-06-12 RX ADMIN — ACYCLOVIR 400 MG: 400 TABLET ORAL at 10:03

## 2023-06-12 RX ADMIN — IBUPROFEN 600 MG: 600 TABLET, FILM COATED ORAL at 04:14

## 2023-06-12 RX ADMIN — HYDROCODONE BITARTRATE AND ACETAMINOPHEN 1 TABLET: 10; 325 TABLET ORAL at 12:55

## 2023-06-12 RX ADMIN — FERROUS SULFATE TAB 325 MG (65 MG ELEMENTAL FE) 325 MG: 325 (65 FE) TAB at 14:51

## 2023-06-12 RX ADMIN — DOCUSATE SODIUM 100 MG: 100 CAPSULE, LIQUID FILLED ORAL at 08:54

## 2023-06-12 RX ADMIN — HYDROCODONE BITARTRATE AND ACETAMINOPHEN 1 TABLET: 10; 325 TABLET ORAL at 04:15

## 2023-06-12 RX ADMIN — HYDROCODONE BITARTRATE AND ACETAMINOPHEN 1 TABLET: 10; 325 TABLET ORAL at 19:42

## 2023-06-12 NOTE — LACTATION NOTE
This note was copied from a baby's chart.  Informed PT that LC is here to help with BF today until 2300. Offered assistance but mother declined, said she will call later, when baby is due to eat if she decides to BF. Reports infant has been very sleepy and she has been feeding formula. She has HGP at the bedside, but said that just pumped once and got only a drop. Encouraged PT to pump. Educated on the importance of stimulation for adequate milk supply. She is not sure if she will try to BF or be switching completely to formula. PT denies any questions and concerns at this time. Encouraged to call LC if needing further assistance.

## 2023-06-12 NOTE — PROGRESS NOTES
Postpartum Progress Note      Status post Vaginal Delivery: Doing well postoperatively.     1) postpartum care immediately following delivery :    - Continue routine postpartum care.    - Anticipate discharge home on PPD#2.     2. Ingestion   - ordered for Protonix 40 mg PO daily.     3. Postpartum anemia, mild  - Hgb 13.2-->10.9   - She remains asymptomatic.   - Will start on PO iron supplementation.     Rh status: O positive   Rubella: Not immune, will order MMR to be received prior to discharge.   Gender: Female infant     Subjective     Postpartum Day 1: Vaginal delivery    The patient feels tired. The patient denies emotional concerns. Pain is well controlled with current medications. The baby is well. The patient is ambulating well. The patient is tolerating a normal diet. She reports her vaginal bleeding is light. She is passing flatus.     Objective     Vital signs in last 24 hours:  Temp:  [97.7 °F (36.5 °C)-98.2 °F (36.8 °C)] 97.7 °F (36.5 °C)  Heart Rate:  [74-82] 80  Resp:  [16-18] 16  BP: (105-108)/(67-72) 106/69      General:    alert, appears stated age and cooperative   Lungs:  no increased work of breathing, regular respirations    Abdomen:  Soft, Non-tender    Lochia:  appropriate   Uterine Fundus:   firm   Ext    Trace bilateral lower extremity edema    DVT Evaluation:  No evidence of DVT seen on physical exam.     Lab Results   Component Value Date    WBC 15.63 (H) 06/12/2023    HGB 10.9 (L) 06/12/2023    HCT 31.6 (L) 06/12/2023    MCV 90.3 06/12/2023     06/12/2023       Nan Turner MD  6/12/2023  10:09 EDT

## 2023-06-12 NOTE — PAYOR COMM NOTE
"Masha Shafer (22 y.o. Female)     ATTN: NURSE REVIEWER  RE: REQUESTING 1 EXTRA DY FOR [facility] INPAT AUTH FOR DELIVERY  REF#[id number]  PLS REPLY TO [facility] [phone] OR [facility] [phone] FAX# [phone]               Date of Birth   2000    Social Security Number       Address   [address] [place] [address]    Home Phone   [phone]    MRN   [id number]       Church   None    Marital Status   Single                            Admission Date   6/10/23    Admission Type   Emergency    Admitting Provider   Quique Berry MD    Attending Provider   Nan Turner MD    Department, Room/Bed   [address], E354/1       Discharge Date       Discharge Disposition       Discharge Destination                                 Attending Provider: Nan Turner MD    Allergies: Not on File    Isolation: None   Infection: None   Code Status: CPR    Ht: 152.4 cm (60\")   Wt: 72.9 kg (160 lb 12.8 oz)    Admission Cmt: None   Principal Problem:  (spontaneous vaginal delivery) [O80]                   Active Insurance as of 6/10/2023       Primary Coverage       Payor Plan Insurance Group Employer/Plan Group    [facility] BY MENDEL [id number]       Payor Plan Address Payor Plan Phone Number Payor Plan Fax Number Effective Dates    [address] [address]   2021 - None Entered    [facility] [phone]         Subscriber Name Subscriber Birth Date Member ID       MASHA SHAFER 2000 [id number]                     Emergency Contacts        (Rel.) Home Phone Work Phone Mobile Phone    tiffanie james (Mother) [phone] -- --                 Physician Progress Notes (last 72 hours)        Nan Turner MD at 23 1009          Postpartum Progress Note      Status post Vaginal Delivery: Doing well postoperatively.     1) postpartum care immediately following delivery :    - Continue routine postpartum care. "    - Anticipate discharge home on PPD#2.     2. Ingestion   - ordered for Protonix 40 mg PO daily.     3. Postpartum anemia, mild  - Hgb 13.2-->10.9   - She remains asymptomatic.   - Will start on PO iron supplementation.     Rh status: O positive   Rubella: Not immune, will order MMR to be received prior to discharge.   Gender: Female infant     Subjective     Postpartum Day 1: Vaginal delivery    The patient feels tired. The patient denies emotional concerns. Pain is well controlled with current medications. The baby is well. The patient is ambulating well. The patient is tolerating a normal diet. She reports her vaginal bleeding is light. She is passing flatus.     Objective     Vital signs in last 24 hours:  Temp:  [97.7 °F (36.5 °C)-98.2 °F (36.8 °C)] 97.7 °F (36.5 °C)  Heart Rate:  [74-82] 80  Resp:  [16-18] 16  BP: (105-108)/(67-72) 106/69      General:    alert, appears stated age and cooperative   Lungs:  no increased work of breathing, regular respirations    Abdomen:  Soft, Non-tender    Lochia:  appropriate   Uterine Fundus:   firm   Ext    Trace bilateral lower extremity edema    DVT Evaluation:  No evidence of DVT seen on physical exam.     Lab Results   Component Value Date    WBC 15.63 (H) 06/12/2023    HGB 10.9 (L) 06/12/2023    HCT 31.6 (L) 06/12/2023    MCV 90.3 06/12/2023     06/12/2023       Nan Turner MD  6/12/2023  10:09 EDT        Electronically signed by Nan Turner MD at 06/12/23 1019       Nan Turner MD at 06/11/23 1133          Postpartum Progress Note      Status post Vaginal Delivery: Doing well postoperatively.     1) postpartum care immediately following delivery :    - Continue routine postpartum care.    - CBC ordered for tomorrow morning.    - Anticipate discharge home on PPD#1-2.     2. Ingestion   - will order Protonix 40 mg PO daily.     Rh status: O positive   Rubella: Not immune, will order MMR to be received prior to discharge.   Gender: Female infant      Subjective     Postpartum Day 0: Vaginal delivery    The patient feels tired. The patient denies emotional concerns. Pain is well controlled with current medications. The baby is well. The patient is ambulating well. The patient is tolerating a normal diet. She reports her vaginal bleeding is light. She is having increased ingestion.     Objective     Vital signs in last 24 hours:  Temp:  [97.6 °F (36.4 °C)-98.3 °F (36.8 °C)] 98.3 °F (36.8 °C)  Heart Rate:  [] 71  Resp:  [18-20] 18  BP: ()/(44-88) 118/80      General:    alert, appears stated age and cooperative   Lungs:  no increased work of breathing, regular respirations    Abdomen:  Soft, Non-tender    Lochia:  appropriate   Uterine Fundus:   firm   Ext    Trace lower extremity edema    DVT Evaluation:  No evidence of DVT seen on physical exam.     Lab Results   Component Value Date    WBC 12.61 (H) 06/10/2023    HGB 13.2 06/10/2023    HCT 37.2 06/10/2023    MCV 88.4 06/10/2023     06/10/2023       Nan Turner MD  6/11/2023  11:33 EDT        Electronically signed by Nan Turner MD at 06/11/23 1202       Consult Notes (last 72 hours)  Notes from 06/09/23 1850 through 06/12/23 1850   No notes of this type exist for this encounter.       Maternal Vitals (last 3 days)       Date/Time Temp Pulse Resp BP SpO2 Weight    06/12/23 1535 -- 98 16 103/67 -- --    06/12/23 0745 97.7 (36.5) 80 16 106/69 -- --    06/11/23 2330 98 (36.7) 82 18 105/72 -- --    06/11/23 1553 98.2 (36.8) 74 16 108/67 -- --    06/11/23 0706 98.3 (36.8) 71 18 118/80 -- --    06/11/23 0635 98.1 (36.7) 74 18 -- -- --    06/11/23 0545 -- 90 18 119/87 -- --    06/11/23 0530 -- 81 18 113/69 -- --    06/11/23 0515 -- 78 18 116/75 -- --    06/11/23 0500 -- 74 18 106/78 -- --    06/11/23 0445 -- 84 18 83/67  -- --    BP: asymptomatic; BP cuff adjusted at 06/11/23 0445    06/11/23 0430 97.8 (36.6) 103 18 95/83 -- --    06/11/23 0415 -- 105 18 114/65 -- --    06/11/23 0400 --  90 -- 119/88 100 --    06/11/23 0354 -- 85 -- -- 99 --    06/11/23 0345 -- 80 -- 98/56 -- --    06/11/23 0336 97.9 (36.6) -- -- -- -- --    06/11/23 0330 -- 108 -- 93/67 -- --    06/11/23 0315 -- 130 -- 106/68 -- --    06/11/23 0300 -- 108 -- 115/53 100 --    06/11/23 0254 -- 103 -- -- 100 --    06/11/23 0245 -- 93 -- 100/65 100 --    06/11/23 0230 -- 93 -- 93/79 100 --    06/11/23 0215 -- 89 -- 113/77 100 --    06/11/23 0145 -- 78 -- 98/51 100 --    06/11/23 0130 -- 72 -- 92/60 100 --    06/11/23 0115 -- 71 -- 97/58 100 --    06/11/23 0100 -- 87 -- 112/65 98 --    06/11/23 0045 -- 79 -- 110/70 -- --    06/11/23 0034 -- 83 -- -- 99 --    06/11/23 0030 -- 85 -- 105/58 -- --    06/11/23 0015 -- 71 -- 93/47 -- --    06/11/23 0014 -- 76 -- -- 98 --    06/11/23 0000 -- 76 -- 97/50 -- --    06/10/23 2359 -- 78 -- -- 99 --    06/10/23 2345 -- 79 -- 99/51 -- --    06/10/23 2344 -- 79 -- -- 100 --    06/10/23 2330 -- 102 -- 93/54 -- --    06/10/23 2329 -- 83 -- -- 100 --    06/10/23 2315 -- 95 -- 94/75 -- --    06/10/23 2314 -- 91 -- -- 100 --    06/10/23 2304 -- 95 -- -- 100 --    06/10/23 2300 -- 97 -- 105/68 -- --    06/10/23 2249 -- 102 -- -- 100 --    06/10/23 2245 -- 92 -- 102/64 -- --    06/10/23 2234 -- 87 -- -- 100 --    06/10/23 2230 -- 87 -- 112/62 -- --    06/10/23 2215 -- 130 -- 82/44 -- --    06/10/23 2214 -- 104 -- -- 99 --    06/10/23 2200 -- 79 -- 95/60 -- --    06/10/23 2159 -- 91 -- -- 100 --    06/10/23 2145 -- 75 -- 99/49 -- --    06/10/23 2144 -- 88 -- -- 98 --    06/10/23 2139 -- 87 -- 90/65 97 --    06/10/23 2135 97.7 (36.5) -- -- -- -- --    06/10/23 2120 -- 101 -- 99/63 -- --    06/10/23 2119 -- 83 -- -- 98 --    06/10/23 2115 -- 97 -- 97/56 -- --    06/10/23 2114 -- 90 -- -- 95 --    06/10/23 2110 -- 99 -- 102/48 -- --    06/10/23 2109 -- 87 -- -- 95 --    06/10/23 2105 -- 92 -- 95/47 -- --    06/10/23 2104 -- 83 -- -- 95 --    06/10/23 2102 -- 110 -- 94/49 -- --    06/10/23 2100 -- 97 -- 101/55 --  --    06/10/23 2059 -- 97 -- 101/55 96 --    06/10/23 2058 -- 101 -- 102/61 -- --    06/10/23 2055 -- 110 -- 91/54 -- --    06/10/23 2054 -- 89 -- -- 99 --    06/10/23 2052 -- 95 -- 124/87 -- --    06/10/23 2050 -- 77 -- 121/76 -- --    06/10/23 2026 97.6 (36.4) -- -- -- -- --    06/10/23 1955 -- 99 -- -- 99 --    06/10/23 1940 -- 97 -- -- 98 --    06/10/23 1938 97.7 (36.5) 92 20 127/80 -- --    06/10/23 1928 -- -- -- -- -- 72.9 (160.8)          FHR (last 3 days)       Date/Time Fetal HR Assessment Method Fetal HR (beats/min) Fetal HR Baseline Fetal HR Variability Fetal HR Accelerations Fetal HR Decelerations Fetal HR Tracing Category    06/11/23 0400 external 140 normal range moderate (amplitude range 6 to 25 bpm) absent variable;late --    06/11/23 0345 external 135 normal range moderate (amplitude range 6 to 25 bpm) absent variable --    06/11/23 0330 external 130 normal range moderate (amplitude range 6 to 25 bpm) greater than/equal to 15 bpm;lasting at least 15 seconds absent --    06/11/23 0315 external 130 normal range moderate (amplitude range 6 to 25 bpm) absent variable;early --    06/11/23 0300 external 130 normal range moderate (amplitude range 6 to 25 bpm) greater than/equal to 15 bpm;lasting at least 15 seconds variable --    06/11/23 0245 external 130 normal range moderate (amplitude range 6 to 25 bpm) absent variable --    06/11/23 0230 external 125 normal range moderate (amplitude range 6 to 25 bpm) greater than/equal to 15 bpm;lasting at least 15 seconds variable;prolonged --    06/11/23 0215 external 120 normal range moderate (amplitude range 6 to 25 bpm) absent variable;late --    06/11/23 0200 external 135 normal range moderate (amplitude range 6 to 25 bpm) greater than/equal to 15 bpm;lasting at least 15 seconds variable --    06/11/23 0130 external 130 normal range moderate (amplitude range 6 to 25 bpm) greater than/equal to 15 bpm;lasting at least 15 seconds early;late;prolonged --     06/11/23 0100 external 125 normal range moderate (amplitude range 6 to 25 bpm) greater than/equal to 15 bpm;lasting at least 15 seconds absent --    06/11/23 0030 external 130 normal range moderate (amplitude range 6 to 25 bpm) greater than/equal to 15 bpm;lasting at least 15 seconds early;late --    06/11/23 0000 external 125 normal range moderate (amplitude range 6 to 25 bpm) greater than/equal to 15 bpm;lasting at least 15 seconds early --    06/10/23 2330 external 135 normal range moderate (amplitude range 6 to 25 bpm) greater than/equal to 15 bpm;lasting at least 15 seconds early --    06/10/23 2300 external 130 normal range moderate (amplitude range 6 to 25 bpm) greater than/equal to 15 bpm;lasting at least 15 seconds late --    06/10/23 2230 external 120 normal range moderate (amplitude range 6 to 25 bpm) greater than/equal to 15 bpm;lasting at least 15 seconds --  see 2216; 2225; 2229 --    06/10/23 2229 -- -- -- -- -- variable --    06/10/23 2225 -- -- -- -- -- late --    06/10/23 2216 -- -- -- -- -- prolonged --    06/10/23 2200 external 125 normal range moderate (amplitude range 6 to 25 bpm) greater than/equal to 15 bpm;lasting at least 15 seconds absent --    06/10/23 2130 external 125 normal range moderate (amplitude range 6 to 25 bpm) greater than/equal to 15 bpm;lasting at least 15 seconds absent --    06/10/23 2100 external 130 normal range moderate (amplitude range 6 to 25 bpm) greater than/equal to 15 bpm;lasting at least 15 seconds absent --    06/10/23 2030 external 130 normal range moderate (amplitude range 6 to 25 bpm) greater than/equal to 15 bpm;lasting at least 15 seconds absent --          Uterine Activity (last 3 days)       Date/Time Method Contraction Frequency (Minutes) Contraction Duration (sec) Contraction Intensity Uterine Resting Tone Contraction Pattern    06/11/23 0400 IUPC (intrauterine pressure catheter) q 2minutes 60-90 strong by palpation soft by palpation --    06/11/23 4525  IUPC (intrauterine pressure catheter) 2-3 60-80 strong by palpation soft by palpation --    06/11/23 0300 IUPC (intrauterine pressure catheter) 1-3 40-70 strong by palpation soft by palpation --    06/11/23 0230 IUPC (intrauterine pressure catheter) 1-2 50-90 strong by palpation soft by palpation --    06/11/23 0200 IUPC (intrauterine pressure catheter) 1-2  strong by palpation soft by palpation --    06/11/23 0130 IUPC (intrauterine pressure catheter) 2-2.5 60-80 strong by palpation soft by palpation --    06/11/23 0100 IUPC (intrauterine pressure catheter) 2-3 60-80 strong by palpation soft by palpation --    06/11/23 0030 IUPC (intrauterine pressure catheter) 2-3.5  strong by palpation soft by palpation --    06/11/23 0000 IUPC (intrauterine pressure catheter) 2-3 70-90 strong by palpation soft by palpation --    06/10/23 2330 IUPC (intrauterine pressure catheter) 2-3  moderate by palpation soft by palpation --    06/10/23 2300 IUPC (intrauterine pressure catheter) 2-4 60-90 moderate by palpation soft by palpation --    06/10/23 2230 IUPC (intrauterine pressure catheter) 2-5 70-80 moderate by palpation soft by palpation --    06/10/23 2200 IUPC (intrauterine pressure catheter) 3-4  moderate by palpation soft by palpation --    06/10/23 2130 external tocotransducer 2-7 50-80 moderate by palpation soft by palpation --    06/10/23 2100 external tocotransducer 2-3.5 60-90 moderate by palpation soft by palpation --    06/10/23 2030 external tocotransducer 2-4 60-90 moderate by palpation soft by palpation --          Labor Pain (last 3 days)       Date/Time (0-10) Pain Rating: Rest (0-10) Pain Rating: Activity Pain Management Interventions    06/12/23 1453 4 -- see MAR    06/12/23 1255 5 6 see MAR    06/12/23 1048 1 -- --    06/12/23 0853 5 6 see MAR    06/12/23 0414 4.5 7 see MAR    06/11/23 2358 9 -- care clustered;quiet environment facilitated;see MAR    06/11/23 2020 4 5 care clustered;quiet  environment facilitated;see MAR;cold applied    06/11/23 1559 5 -- see MAR    06/11/23 1257 4 -- see MAR    06/11/23 1038 1 -- --    06/11/23 0952 4 5 see MAR    06/11/23 0800 4 -- no interventions per patient request    06/11/23 0705 0 0 --    06/11/23 0635 0 0 --    06/11/23 0500 4 4 see MAR;cold applied    06/11/23 0445 4 4 --    06/11/23 0430 0 0 --    06/11/23 0415 0 0 --    06/10/23 2100 0 0 --    06/10/23 1947 5 5 --

## 2023-06-12 NOTE — PROGRESS NOTES
"Discharge Planning Assessment  [facility]     Patient Name: Masha Shafer  MRN: [id number]  Today's Date: 6/12/2023    Admit Date: 6/10/2023    Plan: Infant may discharge to mother when medically ready. MOE [facility].   Discharge Needs Assessment    No documentation.                  Discharge Plan       Row Name 06/12/23 1206       Plan    Plan Infant may discharge to mother when medically ready. [facility]E [facility].    Plan Comments Mother: Masha Shafer, MRN: [id number]; infant: Kevin \"Ara\" Hollis, MRN: [id number]. CSW consulted for \"family dynamics.\" Of note, no toxicology screens were ordered for mother or infant as need was not warranted at this time. CSW met with mother's RN prior to meeting with mother. Mother's RN reports mother is \"pretty sure\" the simin in the room is FOB and it's causing her stress. CSW met with mother at bedside while maternal grandparents and father of infant were in the room. Mother gave consent for father of infant and maternal grandparents to be present during assessment.  Mother verified address, phone number, and insurance. Father of infant shared he told [employer] he didn't want infant added to mother's passport plan and would add infant to his health insurance. Mother shared she wants infant added to her passport health insurance. Mother asked for CSW to consult [employer]. CSW consulted [employer]. Mother reports having a car seat, crib/bassinet, clothes, and diapers for infant. This is mother and father's first child. Mother reports maternal grandparents, paternal grandparents, and father of infant are available for support as needed. Mother reports infant is following up with Dr. Doll after discharge; mother is comfortable scheduling appointments for infant and has transportation. Mother is current with [facility]. The [facility] rep has not met with mother as of yet. CSW consulted [facility] rep. CSW spoke to mother about the HANDS program and mother agreed to CSW making a " referral today. CSW also completed the SDOH questions with mother. CSW provided mother with a packet of resources including: WIC, HANDS, transportation, infant supplies, counseling, online support groups, postpartum mood and anxiety resources, and general community resources. CSW spent time building rapport with mother, and offered validation, support, and encouragement to mother throughout assessment. Mother was polite and appropriate, and denied having unmet needs or concerns at this time. CSW will remain available for psychosocial needs during mother's hospital stay. MOE [facility].                  Continued Care and Services - Admitted Since 6/10/2023    Coordination has not been started for this encounter.       Expected Discharge Date and Time       Expected Discharge Date Expected Discharge Time    Jun 11, 2023  1:02 AM           Demographic Summary       Row Name 06/12/23 1205       General Information    Admission Type inpatient    Arrived From home    Referral Source nursing    Reason for Consult other (see comments)    General Information Comments Family dynamics                   Functional Status       Row Name 06/12/23 1205       Functional Status, IADL    Medications independent    Meal Preparation independent    Housekeeping independent    Laundry independent    Shopping independent       Mental Status    General Appearance WDL WDL       Mental Status Summary    Recent Changes in Mental Status/Cognitive Functioning no changes       Employment/    Employment Status unemployed                   Psychosocial       Row Name 06/12/23 1205       Behavior WDL    Behavior WDL WDL       Emotion Mood WDL    Emotion/Mood/Affect WDL WDL       Speech WDL    Speech WDL WDL       Perceptual State WDL    Perceptual State WDL WDL       Thought Process WDL    Thought Process WDL WDL       Intellectual Performance WDL    Intellectual Performance WDL WDL       Coping/Stress    Major Change/Loss/Stressor  birth;family problems    Patient Personal Strengths future/goal oriented;motivated;positive attitude;strong support system    Sources of Support parent(s);other family members;community support                   Abuse/Neglect       Row Name 06/12/23 1206       Personal Safety    Physical Signs of Abuse Present no                   Legal    No documentation.                  Substance Abuse    No documentation.                  Patient Forms    No documentation.                     YUMIKO Bell

## 2023-06-12 NOTE — PLAN OF CARE
Goal Outcome Evaluation:              Outcome Evaluation: VSS, pain controlled with pain meds, voiding and ambulating well

## 2023-06-13 VITALS
TEMPERATURE: 98 F | DIASTOLIC BLOOD PRESSURE: 70 MMHG | SYSTOLIC BLOOD PRESSURE: 102 MMHG | WEIGHT: 160.8 LBS | BODY MASS INDEX: 31.57 KG/M2 | HEIGHT: 60 IN | RESPIRATION RATE: 16 BRPM | OXYGEN SATURATION: 100 % | HEART RATE: 80 BPM

## 2023-06-13 PROCEDURE — 90471 IMMUNIZATION ADMIN: CPT | Performed by: OBSTETRICS & GYNECOLOGY

## 2023-06-13 PROCEDURE — 25010000002 MEASLES, MUMPS & RUBELLA VAC RECONSTITUTED SOLUTION: Performed by: OBSTETRICS & GYNECOLOGY

## 2023-06-13 PROCEDURE — 90707 MMR VACCINE SC: CPT | Performed by: OBSTETRICS & GYNECOLOGY

## 2023-06-13 RX ORDER — IBUPROFEN 600 MG/1
600 TABLET ORAL EVERY 6 HOURS PRN
Qty: 25 TABLET | Refills: 1 | Status: SHIPPED | OUTPATIENT
Start: 2023-06-13

## 2023-06-13 RX ORDER — HYDROCODONE BITARTRATE AND ACETAMINOPHEN 5; 325 MG/1; MG/1
1 TABLET ORAL EVERY 6 HOURS PRN
Qty: 14 TABLET | Refills: 0 | Status: SHIPPED | OUTPATIENT
Start: 2023-06-13 | End: 2023-06-18

## 2023-06-13 RX ADMIN — IBUPROFEN 600 MG: 600 TABLET, FILM COATED ORAL at 06:33

## 2023-06-13 RX ADMIN — PANTOPRAZOLE SODIUM 40 MG: 40 TABLET, DELAYED RELEASE ORAL at 06:33

## 2023-06-13 RX ADMIN — ACYCLOVIR 400 MG: 400 TABLET ORAL at 08:31

## 2023-06-13 RX ADMIN — MEASLES, MUMPS, AND RUBELLA VIRUS VACCINE LIVE 0.5 ML: 1000; 12500; 1000 INJECTION, POWDER, LYOPHILIZED, FOR SUSPENSION SUBCUTANEOUS at 12:22

## 2023-06-13 RX ADMIN — Medication 1 TABLET: at 08:28

## 2023-06-13 RX ADMIN — FERROUS SULFATE TAB 325 MG (65 MG ELEMENTAL FE) 325 MG: 325 (65 FE) TAB at 08:28

## 2023-06-13 RX ADMIN — HYDROCODONE BITARTRATE AND ACETAMINOPHEN 1 TABLET: 5; 325 TABLET ORAL at 08:28

## 2023-06-13 RX ADMIN — DOCUSATE SODIUM 100 MG: 100 CAPSULE, LIQUID FILLED ORAL at 08:29

## 2023-06-13 NOTE — DISCHARGE SUMMARY
Date of Discharge:  6/13/2023    Discharge Diagnosis: 40 weeks 1 day gestation status post spontaneous vaginal delivery    Presenting Problem/History of Present Illness  Pregnant [Z34.90]       Hospital Course  Patient is a 22 y.o. female .  G2, P0 Ab1 at 40 weeks 1 day gestation presented with onset of labor.  Patient had epidural anesthesia and progressed normally to complete dilatation undergoing a spontaneous vaginal delivery of a female infant with Apgars of 7 and 9 weighing 7 pounds 3 ounces.  She had a second-degree perineal laceration repair.  Both she and her baby have done well postpartum and she is desiring discharge home on her second postpartum day with her sutures healing well and remaining afebrile.  Her blood type is O+.  Her rubella status is nonimmune for which she does want MMR and her hemoglobin is stable at 10.9 for which she will stay on iron.    Procedures Performed         Consults:   Consults       No orders found from 5/12/2023 to 6/11/2023.            Pertinent Test Results:  As above    Condition on Discharge: Stable    Vital Signs  Temp:  [97.6 °F (36.4 °C)-98 °F (36.7 °C)] 98 °F (36.7 °C)  Heart Rate:  [80-98] 80  Resp:  [16] 16  BP: (102-113)/(67-73) 102/70    Physical Exam:   Vital signs stable.  Afebrile.  Lochia scant.  Sutures healing well.    Discharge Disposition  Home or Self Care    Discharge Medications     Discharge Medications        ASK your doctor about these medications        Instructions Start Date   acyclovir 400 MG tablet  Commonly known as: ZOVIRAX   400 mg, Oral, 3 Times Daily, Take no more than 5 doses a day.      ferrous sulfate 325 (65 FE) MG tablet   325 mg, Oral, Daily With Breakfast      metroNIDAZOLE 500 MG tablet  Commonly known as: FLAGYL   500 mg, Oral, 3 Times Daily      pantoprazole 20 MG EC tablet  Commonly known as: Protonix   20 mg, Oral, Daily      Prenatal Vitamin 27-0.8 MG tablet   1 tablet, Oral, Daily               Discharge Diet:    Regular  Activity at Discharge:   As tolerated  Follow-up Appointments  6 weeks with Dr. Turner      Test Results Pending at Discharge       Moncho Uribe MD  06/13/23  10:45 EDT    Time:  10:48 AM

## 2023-06-13 NOTE — LACTATION NOTE
This note was copied from a baby's chart.  PT is going home today. Mom reports baby is not latching at all, but she pumped last night and will continue with exclusively pumping.  Educated again on the importance of stimulation for adequate milk supply. Informed her about the [facility] info and and mommy and Me info on the back of the educational booklet. Discussed engorgement, pumping, milk storage, colostrum expectations and when to expect mature milk supply. PT declines any questions and concerns at this time. Encouraged to call LC if needing further assistance.

## 2023-06-13 NOTE — PLAN OF CARE
Goal Outcome Evaluation:  Plan of Care Reviewed With: patient        Progress: improving  Outcome Evaluation: VSS.  Pt up ad vasile and voiding without difficulty.  Fundal assessment and lochia, wnl.  Pain controlled w/ibuprofen and norco.

## 2023-09-07 ENCOUNTER — OFFICE VISIT (OUTPATIENT)
Dept: OBSTETRICS AND GYNECOLOGY | Facility: CLINIC | Age: 23
End: 2023-09-07
Payer: COMMERCIAL

## 2023-09-07 VITALS
DIASTOLIC BLOOD PRESSURE: 75 MMHG | BODY MASS INDEX: 27.88 KG/M2 | WEIGHT: 142 LBS | SYSTOLIC BLOOD PRESSURE: 104 MMHG | HEIGHT: 60 IN

## 2023-09-07 DIAGNOSIS — Z11.3 SCREENING EXAMINATION FOR STD (SEXUALLY TRANSMITTED DISEASE): Primary | ICD-10-CM

## 2023-09-07 DIAGNOSIS — Z30.09 BIRTH CONTROL COUNSELING: ICD-10-CM

## 2023-09-07 DIAGNOSIS — N89.8 VAGINAL ITCHING: ICD-10-CM

## 2023-09-07 RX ORDER — METRONIDAZOLE 500 MG/1
500 TABLET ORAL 2 TIMES DAILY
Qty: 14 TABLET | Refills: 0 | Status: SHIPPED | OUTPATIENT
Start: 2023-09-07 | End: 2023-09-14

## 2023-09-07 NOTE — PROGRESS NOTES
"Chief Complaint   Patient presents with    Follow-up     Here to discuss B/C and has vaginal itching and burning         SUBJECTIVE:     Masha Shafer is a 23 y.o.  who presents to discuss birth control options and she complains of vaginal itching and burning. She reports that she recently had unprotected intercourse with ex-partner and afterwards has experienced bad vaginal itching and burning with some vaginal odor. Denies abdominal or pelvic pain, vaginal discharge.   She would like to discuss birth control options after initially declining at her postpartum visit in July. She has previously used depo provera when she was younger and it made her more depressed while helping her lose weight and see improvement in her acne. She also tried the pill but had difficulty taking it every day. Lastly, she has tried the Nexplanon but experienced weight gain.   Her last menstrual period was on 23     Past Medical History:   Diagnosis Date    Herpes       History reviewed. No pertinent surgical history.   Social History     Tobacco Use    Smoking status: Never   Vaping Use    Vaping Use: Former    Substances: Nicotine    Devices: Disposable   Substance Use Topics    Alcohol use: Not Currently    Drug use: Never     OB History    Para Term  AB Living   1 1 1     1   SAB IAB Ectopic Molar Multiple Live Births           0 1      # Outcome Date GA Lbr Luis/2nd Weight Sex Delivery Anes PTL Lv   1 Term 23 40w1d 04:51 / 01:44 3280 g (7 lb 3.7 oz) F Vag-Spont EPI N MARLIN      Birth Comments: LDR1 panda        Review of Systems   Gastrointestinal:  Negative for abdominal pain.   Genitourinary:  Negative for pelvic pain, vaginal bleeding, vaginal discharge and vaginal pain.     OBJECTIVE:   Vitals:    23 1050   BP: 104/75   Weight: 64.4 kg (142 lb)   Height: 152.4 cm (60\")        Physical Exam  Vitals reviewed. Exam conducted with a chaperone present.   Constitutional:       General: She is not in " acute distress.  HENT:      Head: Normocephalic and atraumatic.      Right Ear: External ear normal.      Left Ear: External ear normal.   Eyes:      Extraocular Movements: Extraocular movements intact.      Pupils: Pupils are equal, round, and reactive to light.   Pulmonary:      Effort: Pulmonary effort is normal. No respiratory distress.   Abdominal:      General: There is no distension.      Palpations: Abdomen is soft.      Tenderness: There is no abdominal tenderness. There is no guarding or rebound.   Genitourinary:     General: Normal vulva.      Exam position: Lithotomy position.      Labia:         Right: No rash, tenderness, lesion or injury.         Left: No rash, tenderness, lesion or injury.       Urethra: No prolapse or urethral swelling.      Vagina: Vaginal discharge (Thin white-yellow vaginal discharge in vaginal vault.) present. No erythema, tenderness, bleeding or lesions.      Cervix: Normal.      Uterus: Not enlarged, not fixed and not tender.       Adnexa:         Right: No mass, tenderness or fullness.          Left: No mass, tenderness or fullness.     Musculoskeletal:         General: No deformity. Normal range of motion.      Cervical back: Normal range of motion and neck supple.   Lymphadenopathy:      Lower Body: No right inguinal adenopathy. No left inguinal adenopathy.   Skin:     General: Skin is warm and dry.   Neurological:      General: No focal deficit present.      Mental Status: She is alert and oriented to person, place, and time.   Psychiatric:         Mood and Affect: Mood normal.         Behavior: Behavior normal.       ASSESSMENT:     ICD-10-CM ICD-9-CM   1. Screening examination for STD (sexually transmitted disease)  Z11.3 V74.5   2. Vaginal itching  N89.8 698.1   3. Birth control counseling  Z30.09 V25.09       PLAN:   - [facility]lected [facility] to evaluate vaginal itching and screen for STD. Will prescribe Diflucan 150 mg PO Q 3 days x 2 doses for suspected yeast infection  given vaginal itching. Will notify patient of results and need for treatment.  -  Patient was counseled on contraceptive options, including oral contraceptive pills, Depo Provera, vaginal rings, contraceptive patches, long acting reversible contraceptive options (Nexplanon, Mirena, Paragard, Kyleena,Vanessa), barrier methods (such as condoms).  Patient is interested in Vanessa IUD.  She was counseled on this methods efficacy, how to initiate this method, use of back up contraception. She was counseled on the risk of transmission of STDs and expected changes in the menstrual cycle.  She was counseled on the risks involved with this medication including but not limited to bleeding irregularities, mood changes, weight changes, skin changes. To start the contraception we discussed it is necessary that we are reasonably certain that she is not pregnant.  In order to do this, we must start after 2 weeks following last unprotected intercourse or insert at time of menses.  She agreed to abide by these conditions.   She was recommended to follow up soon if there are any side effects or problems.  She will return in 9 days for Vanessa IUD insertion.     See below for orders    Orders Placed This Encounter   Procedures    NuSwab VG+ - Swab, Vagina     Order Specific Question:   Release to patient     Answer:   Routine Release [[id number]]      Return in about 1 day (around 9/8/2023) for Vanessa IUD insertion .    Nan Turner MD

## 2023-09-09 LAB
A VAGINAE DNA VAG QL NAA+PROBE: NORMAL SCORE
BVAB2 DNA VAG QL NAA+PROBE: NORMAL SCORE
C ALBICANS DNA VAG QL NAA+PROBE: NEGATIVE
C GLABRATA DNA VAG QL NAA+PROBE: NEGATIVE
C TRACH DNA VAG QL NAA+PROBE: NEGATIVE
MEGA1 DNA VAG QL NAA+PROBE: NORMAL SCORE
N GONORRHOEA DNA VAG QL NAA+PROBE: NEGATIVE
T VAGINALIS DNA VAG QL NAA+PROBE: NEGATIVE

## 2023-09-12 ENCOUNTER — TELEPHONE (OUTPATIENT)
Dept: OBSTETRICS AND GYNECOLOGY | Facility: CLINIC | Age: 23
End: 2023-09-12
Payer: COMMERCIAL

## 2023-09-12 DIAGNOSIS — N89.8 VAGINAL ITCHING: Primary | ICD-10-CM

## 2023-09-12 RX ORDER — FLUCONAZOLE 150 MG/1
150 TABLET ORAL
Qty: 2 TABLET | Refills: 0 | Status: SHIPPED | OUTPATIENT
Start: 2023-09-12

## 2023-09-12 NOTE — TELEPHONE ENCOUNTER
"Pt missed appt for iud.  However, she now wants to get the non hormonal iud.      Also reports \"really bad itching\", so may need an rx for yeast.     Please advise on how to proceed with scheduling iud.    Thanks,   Ashley    "

## 2023-09-12 NOTE — TELEPHONE ENCOUNTER
Pt informed of rx at pharmacy.     Scheduled iud insertion for this Thursday, 9/14 at [facility] office.     States her cycle just ended on Saturday, 9/9, has not had intercourse, will remain abstinent until after procedure.     [facility]-please bring Paravikasd to [facility] on Thursday, if not already here.     Thanks,   Ashley

## 2023-09-14 ENCOUNTER — PROCEDURE VISIT (OUTPATIENT)
Dept: OBSTETRICS AND GYNECOLOGY | Facility: CLINIC | Age: 23
End: 2023-09-14
Payer: COMMERCIAL

## 2023-09-14 VITALS
DIASTOLIC BLOOD PRESSURE: 63 MMHG | WEIGHT: 142 LBS | HEIGHT: 60 IN | SYSTOLIC BLOOD PRESSURE: 99 MMHG | BODY MASS INDEX: 27.88 KG/M2

## 2023-09-14 DIAGNOSIS — Z30.430 ENCOUNTER FOR IUD INSERTION: Primary | ICD-10-CM

## 2023-09-14 DIAGNOSIS — R39.9 UTI SYMPTOMS: ICD-10-CM

## 2023-09-14 LAB
B-HCG UR QL: NEGATIVE
EXPIRATION DATE: NORMAL
INTERNAL NEGATIVE CONTROL: NEGATIVE
INTERNAL POSITIVE CONTROL: POSITIVE
Lab: NORMAL

## 2023-09-14 RX ORDER — SULFAMETHOXAZOLE AND TRIMETHOPRIM 800; 160 MG/1; MG/1
1 TABLET ORAL 2 TIMES DAILY
Qty: 10 TABLET | Refills: 0 | Status: SHIPPED | OUTPATIENT
Start: 2023-09-14 | End: 2023-09-19

## 2023-09-14 RX ORDER — COPPER 313.4 MG/1
INTRAUTERINE DEVICE INTRAUTERINE ONCE
Status: COMPLETED | OUTPATIENT
Start: 2023-09-14 | End: 2023-09-14

## 2023-09-14 RX ADMIN — COPPER: 313.4 INTRAUTERINE DEVICE INTRAUTERINE at 10:45

## 2023-09-14 NOTE — PROGRESS NOTES
Paragard IUD Insertion Procedure Note    Indications: Contraception    Pre Procedural Diagnosis: Encounter for insertion of Paragard intrauterine device     Post Procedural Diagnosis: Same, UTI symptoms     Counseling:  Patient was counseled on risks of IUD insertion including but not limited to abnormal bleeding, infection, uterine perforation, expulsion, failure of contraception resulting in pregnancy, need for additional procedures and/or need for surgery.  All questions were answered to apparent satisfaction.  She was counseled about the duration of treatment, recommended removal in 10 years.  She was advised to perform monthly string checks and to see evaluation with unexpected changes in bleeding patterns and/or unable to feel the strings.      Procedure Details   Urine pregnancy test was done, and result was negative and she denies unprotected intercourse over last 2 weeks.  Gonorrhea/chlamydia testing was done and was NEGATIVE on 9/7/23.    Bimanual exam revealed Anteverted. Cervix cleansed with Betadine. Tenaculum applied to the anterior lip.  Uterus sounded to 8 cm. IUD inserted without difficulty. String visible and trimmed. Hemostasis of tenaculum sites acquired with pressure and silver nitrate. During exam, patient had significant tenderness of bladder wall. She did not leave enough for urine sample. Patient tolerated procedure well.    IUD Information:  See medication record.    Condition:  Stable    Complications:  None    Plan:    The patient was advised to call for any fever or for prolonged or severe pain or bleeding; she was advised to abstain from intercourse for 2-3 days. This device is immediately effective birth control.  She was advised to use OTC ibuprofen as needed for mild to moderate pain. Return to the clinic in 4 weeks for follow-up.  Also prescribed Bactrim DS BID x 5 days PO to her pharmacy given suspected UTI. She is to call if symptoms do not improve. All questions answered.      Nan Turner MD

## 2023-10-12 ENCOUNTER — OFFICE VISIT (OUTPATIENT)
Dept: OBSTETRICS AND GYNECOLOGY | Facility: CLINIC | Age: 23
End: 2023-10-12
Payer: COMMERCIAL

## 2023-10-12 VITALS
BODY MASS INDEX: 27.88 KG/M2 | SYSTOLIC BLOOD PRESSURE: 110 MMHG | DIASTOLIC BLOOD PRESSURE: 76 MMHG | WEIGHT: 142 LBS | HEIGHT: 60 IN

## 2023-10-12 DIAGNOSIS — N94.6 DYSMENORRHEA: ICD-10-CM

## 2023-10-12 DIAGNOSIS — Z97.5 IUD (INTRAUTERINE DEVICE) IN PLACE: Primary | ICD-10-CM

## 2023-10-12 DIAGNOSIS — N30.01 ACUTE CYSTITIS WITH HEMATURIA: ICD-10-CM

## 2023-10-12 DIAGNOSIS — N89.8 VAGINAL DISCHARGE: ICD-10-CM

## 2023-10-12 DIAGNOSIS — Z30.431 IUD CHECK UP: ICD-10-CM

## 2023-10-12 RX ORDER — SULFAMETHOXAZOLE AND TRIMETHOPRIM 800; 160 MG/1; MG/1
1 TABLET ORAL 2 TIMES DAILY
Qty: 10 TABLET | Refills: 0 | Status: SHIPPED | OUTPATIENT
Start: 2023-10-12 | End: 2023-10-17

## 2023-10-12 RX ORDER — NAPROXEN SODIUM 550 MG/1
550 TABLET ORAL 2 TIMES DAILY WITH MEALS
Qty: 60 TABLET | Refills: 2 | Status: SHIPPED | OUTPATIENT
Start: 2023-10-12 | End: 2023-10-17

## 2023-10-12 NOTE — PROGRESS NOTES
On period for 10 days- during period, felt like labor pain. The day before yesterday stopped bleeding. Still had pain. Today relief.

## 2023-10-12 NOTE — PROGRESS NOTES
"String Check  Chief Complaint   Patient presents with    Follow-up     IUD check      Masha Shafer is a 23 y.o.  who presented after placement of Paragard IUD on 23. She reports that she recently had her period for 10 days with ongoing cramping and labor-like pain while bleeding. She reports that the day before yesterday is when she finally stopped having vaginal bleeding but is still having pelvic pain after it stopped and is getting more relief today. Denies dysuria, urinary urgency or frequency.     OBJECTIVE:  /76   Ht 152.4 cm (60\")   Wt 64.4 kg (142 lb)   BMI 27.73 kg/m²    Gen: NAD  Abd: suprapubic tenderness present on exam, soft, non-distended   Pelvic: IUD strings seen and appear appropriate in length, thick white milky discharge present in vaginal vault     Transvaginal ultrasound performed that demonstrated normal sized anteverted uterus with IUD in appropriate position in the endometrial cavity.     UA: positive of blood, moderate leukocytes     ASSESSMENT:   IUD in place  Dysmenorrhea  Vaginal discharge   Acute cystitis with hematuria     PLAN:   - I discussed with the patient that her IUD strings appeared normal position on exam and ultrasound noted that the IUD is in appropriate position. Prolonged vaginal bleeding with periods and dysmenorrhea are common side effects associated with Paragard IUD use. Prescribed Naproxen 550 mg BID x 5 days to be taken at start of menses to help manage of dysmenorrhea. Patient would like to continue to monitor and see if her periods improve.   - Collected NuSwab VG+ to evaluate vaginal discharge for vaginitis and STD screening.   - Urine culture sent and prescription for DS Bactrim BID x 5 days for acute cystitis with hematuria treatment.   - Follow up as needed or for annual exam.     Nan Turner MD    "

## 2023-10-15 LAB
A VAGINAE DNA VAG QL NAA+PROBE: ABNORMAL SCORE
BVAB2 DNA VAG QL NAA+PROBE: ABNORMAL SCORE
C ALBICANS DNA VAG QL NAA+PROBE: POSITIVE
C GLABRATA DNA VAG QL NAA+PROBE: NEGATIVE
C TRACH DNA VAG QL NAA+PROBE: NEGATIVE
MEGA1 DNA VAG QL NAA+PROBE: ABNORMAL SCORE
N GONORRHOEA DNA VAG QL NAA+PROBE: NEGATIVE
T VAGINALIS DNA VAG QL NAA+PROBE: NEGATIVE

## 2023-10-16 DIAGNOSIS — N89.8 VAGINAL ITCHING: ICD-10-CM

## 2023-10-16 DIAGNOSIS — B37.9 YEAST INFECTION: Primary | ICD-10-CM

## 2023-10-16 RX ORDER — FLUCONAZOLE 150 MG/1
150 TABLET ORAL
Qty: 2 TABLET | Refills: 0 | Status: SHIPPED | OUTPATIENT
Start: 2023-10-16

## 2023-10-18 LAB
BACTERIA UR CULT: ABNORMAL
BACTERIA UR CULT: ABNORMAL
OTHER ANTIBIOTIC SUSC ISLT: ABNORMAL

## 2023-12-11 ENCOUNTER — OFFICE VISIT (OUTPATIENT)
Dept: OBSTETRICS AND GYNECOLOGY | Facility: CLINIC | Age: 23
End: 2023-12-11
Payer: COMMERCIAL

## 2023-12-11 VITALS
BODY MASS INDEX: 28.98 KG/M2 | HEIGHT: 60 IN | DIASTOLIC BLOOD PRESSURE: 68 MMHG | SYSTOLIC BLOOD PRESSURE: 112 MMHG | WEIGHT: 147.6 LBS

## 2023-12-11 DIAGNOSIS — N89.8 VAGINAL DISCHARGE: Primary | ICD-10-CM

## 2023-12-11 DIAGNOSIS — N89.8 VAGINAL ITCHING: ICD-10-CM

## 2023-12-11 DIAGNOSIS — Z11.3 SCREENING FOR STD (SEXUALLY TRANSMITTED DISEASE): ICD-10-CM

## 2023-12-11 PROCEDURE — 1160F RVW MEDS BY RX/DR IN RCRD: CPT | Performed by: NURSE PRACTITIONER

## 2023-12-11 PROCEDURE — 99213 OFFICE O/P EST LOW 20 MIN: CPT | Performed by: NURSE PRACTITIONER

## 2023-12-11 PROCEDURE — 1159F MED LIST DOCD IN RCRD: CPT | Performed by: NURSE PRACTITIONER

## 2023-12-11 NOTE — PROGRESS NOTES
"Chief Complaint   Patient presents with    Follow-up     Patient is here today c/o vaginal itching, irregular discharge and odor X 2 weeks. Also requesting DELICIA.      SUBJECTIVE:     Masha Shafer is a 23 y.o.  who presents with c/o vaginal discharge and itching for 2 weeks. C/o intermittent fish like odor. She has recently changed her soaps. She is not using douches. Denies new partners. Denies dysuria. Reports frequent pelvic pain since paraguard IUD placement. She did have u/s in October that confirmed proper placement. This is a new problem. LMP 23. She would like STD screening today. She is a patient of Dr. Truong.    Past Medical History:   Diagnosis Date    Herpes       History reviewed. No pertinent surgical history.     Review of Systems   Constitutional:  Negative for chills, fatigue and fever.   Gastrointestinal:  Negative for abdominal distention and abdominal pain.   Genitourinary:  Negative for dyspareunia, dysuria, frequency, menstrual problem, pelvic pain, vaginal bleeding and vaginal pain. Vaginal discharge: + itching, odor.      OBJECTIVE:   Vitals:    23 1307   BP: 112/68   Weight: 67 kg (147 lb 9.6 oz)   Height: 152.4 cm (60\")        Physical Exam  Constitutional:       General: She is not in acute distress.     Appearance: Normal appearance. She is not ill-appearing, toxic-appearing or diaphoretic.   Genitourinary:      Bladder and urethral meatus normal.      No lesions in the vagina.      Right Labia: No rash, tenderness, lesions, skin changes or Bartholin's cyst.     Left Labia: No tenderness, lesions, skin changes, Bartholin's cyst or rash.     No labial fusion noted.      No inguinal adenopathy present in the right or left side.     Vaginal discharge (thick, yellow, moderate amount) and erythema present.      No vaginal tenderness, bleeding, ulceration or granulation tissue.      No vaginal prolapse present.     No vaginal atrophy present.       Right Adnexa: not " tender, not full, not palpable, no mass present and not absent.     Left Adnexa: not tender, not full, not palpable, no mass present and not absent.     No cervical motion tenderness, discharge, friability, lesion, polyp, nabothian cyst or eversion.      IUD strings visualized.      Uterus is not enlarged, fixed, tender, irregular or prolapsed.      No uterine mass detected.  Abdominal:      General: There is no distension.      Palpations: Abdomen is soft. There is no mass.      Tenderness: There is no abdominal tenderness. There is no guarding.      Hernia: No hernia is present. There is no hernia in the left inguinal area or right inguinal area.   Lymphadenopathy:      Lower Body: No right inguinal adenopathy. No left inguinal adenopathy.   Neurological:      Mental Status: She is alert.   Vitals and nursing note reviewed.       Assessment/Plan    Diagnoses and all orders for this visit:    1. Vaginal discharge (Primary)  -     NuSwab VG+ - Swab, Vagina    2. Vaginal itching  -     NuSwab VG+ - Swab, Vagina    3. Screening for STD (sexually transmitted disease)  -     NuSwab VG+ - Swab, Vagina      Thick yellow discharge and erythema noted, BV vs STI. Offered treatment for BV while awaiting results, she desires to await results and treat if indicated.   Vaginal cultures obtained  Declines serum testing  Encouraged condoms with IC, cotton only underwear, mild or no soaps, avoidance of douching or kranthi steaming  Call with any worsening symptoms     Return if symptoms worsen or fail to improve.    I spent 22 minutes caring for Masha on this date of service. This time includes time spent by me in the following activities: preparing for the visit, reviewing tests, obtaining and/or reviewing a separately obtained history, performing a medically appropriate examination and/or evaluation, counseling and educating the patient/family/caregiver, ordering medications, tests, or procedures, referring and communicating with  other health care professionals, and documenting information in the medical record    JERAD Galindo  12/11/2023  13:27 EST

## 2023-12-13 ENCOUNTER — CLINICAL SUPPORT (OUTPATIENT)
Dept: OBSTETRICS AND GYNECOLOGY | Facility: CLINIC | Age: 23
End: 2023-12-13
Payer: COMMERCIAL

## 2023-12-13 VITALS — HEIGHT: 60 IN | BODY MASS INDEX: 28.83 KG/M2

## 2023-12-13 DIAGNOSIS — R35.0 FREQUENT URINATION: Primary | ICD-10-CM

## 2023-12-13 LAB
A VAGINAE DNA VAG QL NAA+PROBE: ABNORMAL SCORE
BILIRUB BLD-MCNC: NEGATIVE MG/DL
BVAB2 DNA VAG QL NAA+PROBE: ABNORMAL SCORE
C ALBICANS DNA VAG QL NAA+PROBE: NEGATIVE
C GLABRATA DNA VAG QL NAA+PROBE: NEGATIVE
C TRACH DNA VAG QL NAA+PROBE: NEGATIVE
CLARITY, POC: CLEAR
COLOR UR: ABNORMAL
GLUCOSE UR STRIP-MCNC: NEGATIVE MG/DL
KETONES UR QL: NEGATIVE
LEUKOCYTE EST, POC: ABNORMAL
MEGA1 DNA VAG QL NAA+PROBE: ABNORMAL SCORE
N GONORRHOEA DNA VAG QL NAA+PROBE: NEGATIVE
NITRITE UR-MCNC: NEGATIVE MG/ML
PH UR: 6.5 [PH] (ref 5–8)
PROT UR STRIP-MCNC: NEGATIVE MG/DL
RBC # UR STRIP: ABNORMAL /UL
SP GR UR: 1.02 (ref 1–1.03)
T VAGINALIS DNA VAG QL NAA+PROBE: NEGATIVE
UROBILINOGEN UR QL: NORMAL

## 2023-12-13 RX ORDER — METRONIDAZOLE 65 MG/5G
1 GEL TOPICAL ONCE
Qty: 5 G | Refills: 0 | Status: SHIPPED | OUTPATIENT
Start: 2023-12-13 | End: 2023-12-13

## 2023-12-13 RX ORDER — NITROFURANTOIN 25; 75 MG/1; MG/1
100 CAPSULE ORAL 2 TIMES DAILY
Qty: 10 CAPSULE | Refills: 0 | Status: SHIPPED | OUTPATIENT
Start: 2023-12-13 | End: 2023-12-18

## 2023-12-18 LAB
BACTERIA UR CULT: ABNORMAL
BACTERIA UR CULT: ABNORMAL
OTHER ANTIBIOTIC SUSC ISLT: ABNORMAL

## 2024-06-06 ENCOUNTER — OFFICE VISIT (OUTPATIENT)
Dept: FAMILY MEDICINE CLINIC | Facility: CLINIC | Age: 24
End: 2024-06-06
Payer: COMMERCIAL

## 2024-06-06 VITALS
DIASTOLIC BLOOD PRESSURE: 82 MMHG | BODY MASS INDEX: 29.06 KG/M2 | SYSTOLIC BLOOD PRESSURE: 114 MMHG | RESPIRATION RATE: 19 BRPM | OXYGEN SATURATION: 100 % | HEIGHT: 60 IN | HEART RATE: 105 BPM | WEIGHT: 148 LBS

## 2024-06-06 DIAGNOSIS — S02.2XXD CLOSED FRACTURE OF NASAL BONE WITH ROUTINE HEALING, SUBSEQUENT ENCOUNTER: Primary | ICD-10-CM
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
## 2024-06-06 DIAGNOSIS — B37.9 YEAST INFECTION: ICD-10-CM

## 2024-06-06 PROBLEM — N89.8 VAGINAL LEUKORRHEA: Status: ACTIVE | Noted: 2022-02-18

## 2024-06-06 PROBLEM — Z34.90 PREGNANT: Status: RESOLVED | Noted: 2023-06-10 | Resolved: 2024-06-06

## 2024-06-06 PROBLEM — K58.2 IRRITABLE BOWEL SYNDROME WITH BOTH CONSTIPATION AND DIARRHEA: Status: ACTIVE | Noted: 2022-02-18

## 2024-06-06 PROBLEM — L70.0 ACNE VULGARIS: Status: ACTIVE | Noted: 2022-02-18

## 2024-06-06 PROBLEM — O98.519 HSV-2 INFECTION COMPLICATING PREGNANCY: Status: RESOLVED | Noted: 2022-11-12 | Resolved: 2024-06-06

## 2024-06-06 PROBLEM — Z34.03 SUPERVISION OF NORMAL FIRST PREGNANCY IN THIRD TRIMESTER: Status: RESOLVED | Noted: 2023-06-10 | Resolved: 2024-06-06

## 2024-06-06 PROBLEM — Z34.90 PREGNANCY: Status: RESOLVED | Noted: 2022-11-12 | Resolved: 2024-06-06

## 2024-06-06 PROBLEM — B37.31 CANDIDA VAGINITIS: Status: ACTIVE | Noted: 2022-02-18

## 2024-06-06 PROBLEM — B00.9 HSV-2 INFECTION COMPLICATING PREGNANCY: Status: RESOLVED | Noted: 2022-11-12 | Resolved: 2024-06-06

## 2024-06-06 PROCEDURE — 1159F MED LIST DOCD IN RCRD: CPT | Performed by: NURSE PRACTITIONER

## 2024-06-06 PROCEDURE — 99213 OFFICE O/P EST LOW 20 MIN: CPT | Performed by: NURSE PRACTITIONER

## 2024-06-06 PROCEDURE — 1160F RVW MEDS BY RX/DR IN RCRD: CPT | Performed by: NURSE PRACTITIONER

## 2024-06-06 RX ORDER — PSEUDOEPHEDRINE HYDROCHLORIDE 60 MG/1
1 TABLET, FILM COATED ORAL EVERY 4 HOURS PRN
COMMUNITY
Start: 2024-06-02

## 2024-06-06 RX ORDER — FLUCONAZOLE 150 MG/1
150 TABLET ORAL ONCE
Qty: 1 TABLET | Refills: 0 | Status: SHIPPED | OUTPATIENT
Start: 2024-06-06 | End: 2024-06-06

## 2024-06-06 RX ORDER — CYCLOBENZAPRINE HCL 10 MG
10 TABLET ORAL
COMMUNITY
Start: 2024-06-02

## 2024-06-06 RX ORDER — AMOXICILLIN AND CLAVULANATE POTASSIUM 875; 125 MG/1; MG/1
1 TABLET, FILM COATED ORAL
COMMUNITY
Start: 2024-06-02

## 2024-06-06 RX ORDER — HYDROCODONE BITARTRATE AND ACETAMINOPHEN 5; 325 MG/1; MG/1
1 TABLET ORAL
COMMUNITY
Start: 2024-06-02
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
## 2024-06-13 ENCOUNTER — HOSPITAL ENCOUNTER (OUTPATIENT)
Facility: HOSPITAL | Age: 24
Discharge: HOME | End: 2024-06-13
Attending: EMERGENCY MEDICINE | Admitting: EMERGENCY MEDICINE
Payer: COMMERCIAL

## 2024-06-13 VITALS
HEIGHT: 60 IN | DIASTOLIC BLOOD PRESSURE: 98 MMHG | WEIGHT: 147.93 LBS | TEMPERATURE: 97.5 F | HEART RATE: 88 BPM | BODY MASS INDEX: 29.04 KG/M2 | RESPIRATION RATE: 17 BRPM | OXYGEN SATURATION: 99 % | SYSTOLIC BLOOD PRESSURE: 130 MMHG

## 2024-06-13 DIAGNOSIS — H66.001 NON-RECURRENT ACUTE SUPPURATIVE OTITIS MEDIA OF RIGHT EAR WITHOUT SPONTANEOUS RUPTURE OF TYMPANIC MEMBRANE: Primary | ICD-10-CM

## 2024-06-13 DIAGNOSIS — B37.9 CANDIDIASIS: ICD-10-CM

## 2024-06-13 PROCEDURE — 99214 OFFICE O/P EST MOD 30 MIN: CPT | Performed by: EMERGENCY MEDICINE

## 2024-06-13 PROCEDURE — G0463 HOSPITAL OUTPT CLINIC VISIT: HCPCS | Performed by: EMERGENCY MEDICINE

## 2024-06-13 RX ORDER — FLUCONAZOLE 150 MG/1
150 TABLET ORAL ONCE
Status: COMPLETED | OUTPATIENT
Start: 2024-06-13 | End: 2024-06-13

## 2024-06-13 RX ORDER — FLUCONAZOLE 150 MG/1
150 TABLET ORAL ONCE
Qty: 1 TABLET | Refills: 0 | Status: SHIPPED | OUTPATIENT
Start: 2024-06-13 | End: 2024-06-13

## 2024-06-13 RX ADMIN — FLUCONAZOLE 150 MG: 150 TABLET ORAL at 16:17

## 2024-06-13 NOTE — DISCHARGE INSTRUCTIONS
I want you to complete antibiotics.  You were given a dose of Diflucan here in the ER and 1 tablet prescription was sent to your pharmacy take that tomorrow about this time of day.  Might try humidifier or diluted peppermint oil painted like a mustache under your nose to inhale and maybe that will help the eustachian tube open and also open up your sinuses.  Please follow-up with your PCP within a week to 10 days.
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
## 2024-06-14 ENCOUNTER — OFFICE VISIT (OUTPATIENT)
Dept: FAMILY MEDICINE CLINIC | Facility: CLINIC | Age: 24
End: 2024-06-14
Payer: COMMERCIAL

## 2024-06-14 VITALS
WEIGHT: 147 LBS | BODY MASS INDEX: 28.86 KG/M2 | SYSTOLIC BLOOD PRESSURE: 124 MMHG | HEART RATE: 121 BPM | RESPIRATION RATE: 18 BRPM | HEIGHT: 60 IN | OXYGEN SATURATION: 98 % | DIASTOLIC BLOOD PRESSURE: 90 MMHG

## 2024-06-14 DIAGNOSIS — B37.9 YEAST INFECTION: ICD-10-CM

## 2024-06-14 DIAGNOSIS — H66.90 ACUTE OTITIS MEDIA, UNSPECIFIED OTITIS MEDIA TYPE: Primary | ICD-10-CM

## 2024-06-14 PROCEDURE — 1159F MED LIST DOCD IN RCRD: CPT | Performed by: NURSE PRACTITIONER

## 2024-06-14 PROCEDURE — 1160F RVW MEDS BY RX/DR IN RCRD: CPT | Performed by: NURSE PRACTITIONER

## 2024-06-14 PROCEDURE — 99214 OFFICE O/P EST MOD 30 MIN: CPT | Performed by: NURSE PRACTITIONER

## 2024-06-14 RX ORDER — AMOXICILLIN AND CLAVULANATE POTASSIUM 875; 125 MG/1; MG/1
1 TABLET, FILM COATED ORAL 2 TIMES DAILY
Qty: 8 TABLET | Refills: 0 | Status: SHIPPED | OUTPATIENT
Start: 2024-06-14 | End: 2024-06-18

## 2024-06-14 RX ORDER — BENZOCAINE/MENTHOL 6 MG-10 MG
1 LOZENGE MUCOUS MEMBRANE 2 TIMES DAILY
Qty: 14 G | Refills: 0 | Status: SHIPPED | OUTPATIENT
Start: 2024-06-14

## 2024-06-14 RX ORDER — AMOXICILLIN AND CLAVULANATE POTASSIUM 875; 125 MG/1; MG/1
1 TABLET, FILM COATED ORAL 2 TIMES DAILY
COMMUNITY
Start: 2024-06-06 | End: 2024-06-14 | Stop reason: SDUPTHER

## 2024-06-14 NOTE — PROGRESS NOTES
Subjective   Masha Shafer is a 23 y.o. female.     Chief Complaint   Patient presents with    Earache     R ear x1 day        History of Present Illness   Patient presents with c/o right ear infection. She was seen at  yesterday and treated with diflucan for yeast infection that she has from previous antibiotic she started on 6/6/2024. She stopped taking this due to antibiotic causing a yeast infection. I encouraged her to take medication as directed.She has been taking tylenol.     The following portions of the patient's history were reviewed and updated as appropriate: allergies, current medications, past family history, past medical history, past social history, past surgical history and problem list.    Review of Systems   Constitutional:  Negative for chills, fatigue and fever.   HENT:  Positive for ear pain. Negative for ear discharge and facial swelling.    Respiratory:  Negative for cough, chest tightness, shortness of breath and wheezing.    Cardiovascular:  Negative for chest pain, palpitations and leg swelling.   Neurological:  Negative for dizziness and headache.   Hematological: Negative.    Psychiatric/Behavioral: Negative.  Negative for sleep disturbance.        Objective   Physical Exam  Vitals and nursing note reviewed.   Constitutional:       Appearance: She is well-developed.   HENT:      Head: Normocephalic and atraumatic.      Right Ear: Ear canal and external ear normal. Tympanic membrane is erythematous and bulging.      Left Ear: Ear canal and external ear normal. A middle ear effusion is present.   Eyes:      Conjunctiva/sclera: Conjunctivae normal.      Pupils: Pupils are equal, round, and reactive to light.   Cardiovascular:      Rate and Rhythm: Normal rate and regular rhythm.      Heart sounds: Normal heart sounds. No murmur heard.  Pulmonary:      Effort: Pulmonary effort is normal.      Breath sounds: Normal breath sounds.   Neurological:      Mental Status: She is alert and  oriented to person, place, and time.   Psychiatric:         Behavior: Behavior normal.         Thought Content: Thought content normal.         Judgment: Judgment normal.         Vitals:    06/14/24 1341   BP: 124/90   Pulse: (!) 121   Resp: 18   SpO2: 98%     Body mass index is 28.71 kg/m².      Procedures    Assessment & Plan   Problems Addressed this Visit    None  Visit Diagnoses       Acute otitis media, unspecified otitis media type    -  Primary    Relevant Medications    amoxicillin-clavulanate (AUGMENTIN) 875-125 MG per tablet    Yeast infection        Relevant Medications    hydrocortisone 1 % cream          Diagnoses         Codes Comments    Acute otitis media, unspecified otitis media type    -  Primary ICD-10-CM: H66.90  ICD-9-CM: 382.9     Yeast infection     ICD-10-CM: B37.9  ICD-9-CM: 112.9           Augementin 875-125 1 BID X 4 days  Hydrocortisone Cream BID to vaginal area.        Return if symptoms worsen or fail to improve.

## 2024-07-11 ENCOUNTER — OFFICE VISIT (OUTPATIENT)
Dept: FAMILY MEDICINE CLINIC | Facility: CLINIC | Age: 24
End: 2024-07-11
Payer: COMMERCIAL

## 2024-07-11 VITALS
HEIGHT: 60 IN | WEIGHT: 148 LBS | SYSTOLIC BLOOD PRESSURE: 104 MMHG | BODY MASS INDEX: 29.06 KG/M2 | RESPIRATION RATE: 18 BRPM | OXYGEN SATURATION: 98 % | DIASTOLIC BLOOD PRESSURE: 76 MMHG | HEART RATE: 100 BPM

## 2024-07-11 DIAGNOSIS — L70.9 ACNE, UNSPECIFIED ACNE TYPE: ICD-10-CM

## 2024-07-11 DIAGNOSIS — B96.89 BACTERIAL VAGINOSIS: Primary | ICD-10-CM

## 2024-07-11 DIAGNOSIS — N76.0 BACTERIAL VAGINOSIS: Primary | ICD-10-CM

## 2024-07-11 PROCEDURE — 1160F RVW MEDS BY RX/DR IN RCRD: CPT | Performed by: NURSE PRACTITIONER

## 2024-07-11 PROCEDURE — 99213 OFFICE O/P EST LOW 20 MIN: CPT | Performed by: NURSE PRACTITIONER

## 2024-07-11 PROCEDURE — 1159F MED LIST DOCD IN RCRD: CPT | Performed by: NURSE PRACTITIONER

## 2024-07-11 RX ORDER — METRONIDAZOLE 500 MG/1
500 TABLET ORAL 2 TIMES DAILY
Qty: 14 TABLET | Refills: 0 | Status: SHIPPED | OUTPATIENT
Start: 2024-07-11 | End: 2024-07-18

## 2024-07-11 NOTE — PROGRESS NOTES
Subjective   Masha Shafer is a 23 y.o. female.     Chief Complaint   Patient presents with    Vaginal Itching     X1 week    Vaginal Discharge    vaginal odor        History of Present Illness   Patient presents with c/o vaginal itching, vaginal discharge and vaginal odor. She reports that her symptoms are similar to BV. She states that she has this about 4 times a year. She reports only 1 sexual partner. Per epic she has had several yeast and BV infections in the last year. Patient declines STI check today.     Patient is requesting referral to dermatology for chronic acne on her chest.     The following portions of the patient's history were reviewed and updated as appropriate: allergies, current medications, past family history, past medical history, past social history, past surgical history and problem list.    Review of Systems   Constitutional:  Negative for chills, fatigue and fever.   Respiratory:  Negative for cough, chest tightness, shortness of breath and wheezing.    Cardiovascular:  Negative for chest pain, palpitations and leg swelling.   Genitourinary:  Positive for vaginal discharge. Negative for dysuria, flank pain, frequency, hematuria, pelvic pain and pelvic pressure.        Vaginal itching   Neurological:  Negative for dizziness, weakness and headache.   Hematological: Negative.    Psychiatric/Behavioral:  Negative for agitation, behavioral problems and hallucinations.        Objective   Physical Exam  Vitals and nursing note reviewed.   Constitutional:       Appearance: She is well-developed.   HENT:      Head: Normocephalic and atraumatic.   Eyes:      Conjunctiva/sclera: Conjunctivae normal.      Pupils: Pupils are equal, round, and reactive to light.   Cardiovascular:      Rate and Rhythm: Normal rate and regular rhythm.      Heart sounds: Normal heart sounds. No murmur heard.  Pulmonary:      Effort: Pulmonary effort is normal.      Breath sounds: Normal breath sounds.    Genitourinary:     Comments: Patient declined vaginal swab due to being on period  Neurological:      Mental Status: She is alert and oriented to person, place, and time.   Psychiatric:         Behavior: Behavior normal.         Thought Content: Thought content normal.         Judgment: Judgment normal.         Vitals:    07/11/24 1450   BP: 104/76   Pulse: 100   Resp: 18   SpO2: 98%     Body mass index is 28.9 kg/m².      Procedures    Assessment & Plan   Problems Addressed this Visit    None  Visit Diagnoses       Bacterial vaginosis    -  Primary    Relevant Medications    metroNIDAZOLE (Flagyl) 500 MG tablet    Acne, unspecified acne type        Relevant Orders    Ambulatory Referral to Dermatology (Completed)          Diagnoses         Codes Comments    Bacterial vaginosis    -  Primary ICD-10-CM: N76.0, B96.89  ICD-9-CM: 616.10, 041.9     Acne, unspecified acne type     ICD-10-CM: L70.9  ICD-9-CM: 706.1         Flagyl 500 mg 1 p.o. BID X 7 days  Referral to dermatology patient to check plan and let office know who takes passport         Return if symptoms worsen or fail to improve.

## 2024-07-25 ENCOUNTER — OFFICE VISIT (OUTPATIENT)
Dept: OBSTETRICS AND GYNECOLOGY | Facility: CLINIC | Age: 24
End: 2024-07-25
Payer: COMMERCIAL

## 2024-07-25 VITALS
HEIGHT: 60 IN | WEIGHT: 149 LBS | BODY MASS INDEX: 29.25 KG/M2 | DIASTOLIC BLOOD PRESSURE: 74 MMHG | SYSTOLIC BLOOD PRESSURE: 108 MMHG

## 2024-07-25 DIAGNOSIS — B96.89 BV (BACTERIAL VAGINOSIS): ICD-10-CM

## 2024-07-25 DIAGNOSIS — N76.0 BV (BACTERIAL VAGINOSIS): ICD-10-CM

## 2024-07-25 DIAGNOSIS — Z12.4 CERVICAL CANCER SCREENING: ICD-10-CM

## 2024-07-25 DIAGNOSIS — Z01.419 ENCOUNTER FOR WELL WOMAN EXAM: Primary | ICD-10-CM

## 2024-07-25 DIAGNOSIS — Z97.5 IUD (INTRAUTERINE DEVICE) IN PLACE: ICD-10-CM

## 2024-07-25 DIAGNOSIS — Z11.3 SCREENING EXAMINATION FOR STD (SEXUALLY TRANSMITTED DISEASE): ICD-10-CM

## 2024-07-25 RX ORDER — METRONIDAZOLE 7.5 MG/G
1 GEL VAGINAL NIGHTLY
Qty: 70 G | Refills: 0 | Status: SHIPPED | OUTPATIENT
Start: 2024-07-25 | End: 2024-07-30

## 2024-07-25 NOTE — PROGRESS NOTES
"GYN Annual Exam     CC- Here for annual exam.     Masha Shafer is a 23 y.o. female who presents for annual well woman exam. Periods are regular every 28-30 days, lasting 3 days. Dysmenorrhea:none. Cyclic symptoms include none. No intermenstrual bleeding, spotting. She has been having vaginal discharge with odor off and on for a couple of weeks.     OB History          1    Para   1    Term   1            AB        Living   1         SAB        IAB        Ectopic        Molar        Multiple   0    Live Births   1                Current contraception:  Paragard IUD  - 23  History of abnormal Pap smear: no  Family history of uterine, colon or ovarian cancer: no  History of abnormal mammogram:  n/a  Family history of breast cancer: no  Last Pap : 21- NILM     Past Medical History:   Diagnosis Date    Allergic     Headache     Migraines as child    Herpes     Hyperlipidemia     Irritable bowel syndrome with both constipation and diarrhea 2022       History reviewed. No pertinent surgical history.      Current Outpatient Medications:     PARAGARD INTRAUTERINE COPPER IU, 1 each by Intrauterine route Daily., Disp: , Rfl:     No Known Allergies    Social History     Tobacco Use    Smoking status: Never    Smokeless tobacco: Never   Vaping Use    Vaping status: Never Used   Substance Use Topics    Alcohol use: Yes     Comment: occasional    Drug use: Never       Family History   Problem Relation Age of Onset    Heart disease Father     Hyperlipidemia Father     Hypertension Father     Obesity Father        Review of Systems   Genitourinary:  Positive for vaginal discharge.       /74   Ht 152.4 cm (60\")   Wt 67.6 kg (149 lb)   LMP 2024   BMI 29.10 kg/m²     Physical Exam  Vitals reviewed. Exam conducted with a chaperone present.   Constitutional:       General: She is not in acute distress.  HENT:      Head: Normocephalic and atraumatic.      Right Ear: External ear normal.    "   Left Ear: External ear normal.   Eyes:      Extraocular Movements: Extraocular movements intact.      Pupils: Pupils are equal, round, and reactive to light.   Cardiovascular:      Rate and Rhythm: Normal rate.   Pulmonary:      Effort: Pulmonary effort is normal. No respiratory distress.   Chest:   Breasts:     Right: No swelling, bleeding, inverted nipple, mass, nipple discharge, skin change or tenderness.      Left: No swelling, bleeding, inverted nipple, mass, nipple discharge, skin change or tenderness.   Abdominal:      General: There is no distension.      Palpations: Abdomen is soft.      Tenderness: There is no abdominal tenderness. There is no guarding or rebound.   Genitourinary:     General: Normal vulva.      Exam position: Lithotomy position.      Labia:         Right: No rash, tenderness, lesion or injury.         Left: No rash, tenderness, lesion or injury.       Urethra: No prolapse or urethral swelling.      Vagina: Vaginal discharge (Clear white discharge with odor coating vaginal mucosa) present. No erythema, tenderness, bleeding or lesions.      Cervix: Normal.      Uterus: Not enlarged, not fixed and not tender.       Adnexa:         Right: No mass, tenderness or fullness.          Left: No mass, tenderness or fullness.        Comments: IUD strings visualized at cervical os.   Musculoskeletal:         General: No deformity. Normal range of motion.      Cervical back: Normal range of motion and neck supple.   Lymphadenopathy:      Upper Body:      Right upper body: No supraclavicular or axillary adenopathy.      Left upper body: No supraclavicular or axillary adenopathy.      Lower Body: No right inguinal adenopathy. No left inguinal adenopathy.   Skin:     General: Skin is warm and dry.   Neurological:      General: No focal deficit present.      Mental Status: She is alert and oriented to person, place, and time.   Psychiatric:         Mood and Affect: Mood normal.         Behavior: Behavior  normal.              Assessment     1) GYN annual well woman exam.   2) Cervical cancer screening  3) Contraception- Paragard IUD in place  4) STD screening  5) Vaginal discharge- BV     Plan     1) Breast Health - Clinical breast exam yearly, Self breast awareness monthly  2) Pap - Collected pap smear for cervical cancer screening today.   3) Smoking status- non-smoker.   4) Activity recommends - Adult 150-300 min/week of multi-component physical activities that include balance training, aerobic and physical strengthening.    5) Contraception- Paragard IUD in place since 09/2023. Patient is doing well with it in place.   6) STD screening/Vaginal discharge - Patient has vaginal discharge consistent BV. Collected NuSwab VG+ today for evaluation of discharge and screening for STD. Will send prescription for Metrogel 0.75% to be used nightly for 5 days.   7) Follow up prn and one year.       Nan Turner MD

## 2024-07-29 DIAGNOSIS — B37.9 YEAST INFECTION: Primary | ICD-10-CM

## 2024-07-29 LAB
A VAGINAE DNA VAG QL NAA+PROBE: ABNORMAL SCORE
BVAB2 DNA VAG QL NAA+PROBE: ABNORMAL SCORE
C ALBICANS DNA VAG QL NAA+PROBE: POSITIVE
C GLABRATA DNA VAG QL NAA+PROBE: NEGATIVE
C TRACH DNA SPEC QL NAA+PROBE: NEGATIVE
CONV .: NORMAL
CYTOLOGIST CVX/VAG CYTO: NORMAL
CYTOLOGY CVX/VAG DOC CYTO: NORMAL
CYTOLOGY CVX/VAG DOC THIN PREP: NORMAL
DX ICD CODE: NORMAL
Lab: NORMAL
MEGA1 DNA VAG QL NAA+PROBE: ABNORMAL SCORE
N GONORRHOEA DNA VAG QL NAA+PROBE: NEGATIVE
OTHER STN SPEC: NORMAL
STAT OF ADQ CVX/VAG CYTO-IMP: NORMAL
T VAGINALIS DNA VAG QL NAA+PROBE: NEGATIVE

## 2024-07-29 RX ORDER — FLUCONAZOLE 150 MG/1
150 TABLET ORAL
Qty: 2 TABLET | Refills: 0 | Status: SHIPPED | OUTPATIENT
Start: 2024-07-29

## 2024-11-12 ENCOUNTER — OFFICE VISIT (OUTPATIENT)
Dept: FAMILY MEDICINE CLINIC | Facility: CLINIC | Age: 24
End: 2024-11-12
Payer: COMMERCIAL

## 2024-11-12 VITALS
SYSTOLIC BLOOD PRESSURE: 110 MMHG | RESPIRATION RATE: 18 BRPM | DIASTOLIC BLOOD PRESSURE: 78 MMHG | OXYGEN SATURATION: 98 % | BODY MASS INDEX: 30.23 KG/M2 | WEIGHT: 154 LBS | HEART RATE: 99 BPM | HEIGHT: 60 IN

## 2024-11-12 DIAGNOSIS — B37.9 YEAST INFECTION: ICD-10-CM

## 2024-11-12 DIAGNOSIS — H66.90 ACUTE OTITIS MEDIA, UNSPECIFIED OTITIS MEDIA TYPE: Primary | ICD-10-CM

## 2024-11-12 PROCEDURE — 1160F RVW MEDS BY RX/DR IN RCRD: CPT | Performed by: NURSE PRACTITIONER

## 2024-11-12 PROCEDURE — 99213 OFFICE O/P EST LOW 20 MIN: CPT | Performed by: NURSE PRACTITIONER

## 2024-11-12 PROCEDURE — 1159F MED LIST DOCD IN RCRD: CPT | Performed by: NURSE PRACTITIONER

## 2024-11-12 NOTE — PROGRESS NOTES
Subjective   Masha Shafer is a 24 y.o. female.     Chief Complaint   Patient presents with    Otitis Media     L ear is currently on antibiotics for 3 days, patient reports that she is not getting any better        History of Present Illness     History of Present Illness  The patient presents for evaluation of a left ear infection.    She has been experiencing this infection for the past 3 days, which has not improved despite antibiotic treatment. She sought care at an urgent care facility in [place], where she was prescribed amoxicillin. The ear infection has caused significant discomfort, particularly at night when she lies down. She reports a sensation of water in her ears and recalls swimming in a pool during a vacation a few weeks ago. At the time of her urgent care visit, she had pain in both ears and experienced itching. She has been managing the pain with ibuprofen or Tylenol.    She reports feeling unwell today, with symptoms of low energy and body aches. She also reports swollen lymph nodes, particularly in her left ear, and jaw pain. She took Benadryl yesterday but is unsure if it provided any relief. She has developed a yeast infection as a side effect of the antibiotics and has been prescribed Diflucan. Additionally, she has noticed some spotting, which she attributes to the yeast infection.        The following portions of the patient's history were reviewed and updated as appropriate: allergies, current medications, past family history, past medical history, past social history, past surgical history and problem list.    Review of Systems   Constitutional:  Negative for appetite change, chills, fatigue and fever.   HENT:  Positive for ear pain. Negative for congestion, hearing loss, postnasal drip, rhinorrhea, sinus pressure, sneezing, sore throat and tinnitus.    Eyes:  Negative for redness and itching.   Respiratory:  Negative for cough, chest tightness, shortness of breath and wheezing.     Cardiovascular:  Negative for chest pain, palpitations and leg swelling.   Genitourinary:  Positive for vaginal bleeding. Negative for vaginal discharge.        Vaginal itching   Musculoskeletal:  Positive for neck pain. Negative for myalgias.   Skin:  Negative for rash.   Allergic/Immunologic: Negative.    Neurological:  Negative for dizziness and headache.   Hematological:  Positive for adenopathy.       Objective   Physical Exam  Vitals and nursing note reviewed.   Constitutional:       Appearance: She is well-developed.   HENT:      Head: Normocephalic and atraumatic.      Right Ear: Tympanic membrane, ear canal and external ear normal.      Left Ear: External ear normal. A middle ear effusion is present. Tympanic membrane is not injected, perforated, erythematous, retracted or bulging.      Nose: Nose normal.      Right Sinus: No maxillary sinus tenderness or frontal sinus tenderness.      Left Sinus: No maxillary sinus tenderness or frontal sinus tenderness.      Mouth/Throat:      Lips: Pink.      Mouth: Mucous membranes are moist.      Pharynx: No oropharyngeal exudate.   Eyes:      General:         Right eye: No discharge.         Left eye: No discharge.      Conjunctiva/sclera: Conjunctivae normal.      Pupils: Pupils are equal, round, and reactive to light.   Neck:      Thyroid: No thyromegaly.   Cardiovascular:      Rate and Rhythm: Normal rate and regular rhythm.      Heart sounds: Normal heart sounds. No murmur heard.  Pulmonary:      Effort: Pulmonary effort is normal. No tachypnea or respiratory distress.      Breath sounds: Normal breath sounds. No decreased breath sounds, wheezing or rales.   Musculoskeletal:      Cervical back: Normal range of motion and neck supple.   Lymphadenopathy:      Cervical: No cervical adenopathy.   Skin:     General: Skin is warm and dry.   Neurological:      Mental Status: She is alert and oriented to person, place, and time.   Psychiatric:         Behavior:  Behavior normal.         Thought Content: Thought content normal.         Judgment: Judgment normal.         Vitals:    11/12/24 1429   BP: 110/78   Pulse: 99   Resp: 18   SpO2: 98%     Body mass index is 30.08 kg/m².      Procedures    Assessment & Plan   Problems Addressed this Visit    None  Visit Diagnoses       Acute otitis media, unspecified otitis media type    -  Primary    Yeast infection              Diagnoses         Codes Comments    Acute otitis media, unspecified otitis media type    -  Primary ICD-10-CM: H66.90  ICD-9-CM: 382.9     Yeast infection     ICD-10-CM: B37.9  ICD-9-CM: 112.9             Assessment & Plan  1. Left ear infection.  The left ear infection is likely due to residual fluid in the ear. Examination shows the eardrum is cloudy and bulgy but not red, indicating improvement. She was advised to complete the full 10-day course of amoxicillin, even if symptoms improve. Over-the-counter Flonase was recommended to aid in drainage. Advil Dual Action, which contains both Tylenol and ibuprofen, was suggested for pain management. A decongestant such as Sudafed was also recommended.    2. Yeast infection.  She is experiencing a yeast infection likely due to the antibiotics. She was advised to take Diflucan after completing the antibiotic course.                  Return if symptoms worsen or fail to improve.    Patient or patient representative verbalized consent for the use of Ambient Listening during the visit with  JERAD Abraham for chart documentation. 11/12/2024  14:55 EST   Answers submitted by the patient for this visit:  Primary Reason for Visit (Submitted on 11/12/2024)  What is the primary reason for your visit?: Ear Pain  Ear Pain Questionnaire (Submitted on 11/12/2024)  Chief Complaint: Ear pain  hoarse voice: No  jaw pain: Yes

## 2024-12-06 ENCOUNTER — OFFICE VISIT (OUTPATIENT)
Dept: FAMILY MEDICINE CLINIC | Facility: CLINIC | Age: 24
End: 2024-12-06
Payer: COMMERCIAL

## 2024-12-06 VITALS
OXYGEN SATURATION: 97 % | HEART RATE: 87 BPM | BODY MASS INDEX: 25.52 KG/M2 | RESPIRATION RATE: 18 BRPM | WEIGHT: 130 LBS | SYSTOLIC BLOOD PRESSURE: 132 MMHG | HEIGHT: 60 IN | DIASTOLIC BLOOD PRESSURE: 84 MMHG

## 2024-12-06 DIAGNOSIS — Z71.3 ENCOUNTER FOR WEIGHT LOSS COUNSELING: Primary | ICD-10-CM

## 2024-12-06 DIAGNOSIS — Z13.220 SCREENING FOR HYPERLIPIDEMIA: ICD-10-CM

## 2024-12-06 PROBLEM — H66.90 ACUTE OTITIS MEDIA: Status: ACTIVE | Noted: 2024-11-14

## 2024-12-06 NOTE — PROGRESS NOTES
Subjective   Masha Shafer is a 24 y.o. female.     No chief complaint on file.       History of Present Illness     History of Present Illness  The patient presents for weight loss.    She is considering a weight loss program involving semaglutide, motivated by her parents' positive experiences with the medication. She acknowledges a longstanding unhealthy relationship with food, characterized by excessive sugar intake and other dietary indiscretions. Her weight has been fluctuating between 150 to 155 pounds, with a recent increase of 3 pounds over the past 2 days. She has not been able to lose the weight she gained during her pregnancy. She reports no personal history of thyroid cancer or pancreatitis. She is seeking advice on alternative appetite suppressant medications.    She has requested laboratory tests to evaluate her kidney, liver, and thyroid function. She reports no family history of thyroid disease but mentions a familial predisposition to kidney and liver issues.    FAMILY HISTORY  The patient's stepmother and father are obese. The patient has a family history of kidney and liver problems. The patient's father had pancreatitis.     The following portions of the patient's history were reviewed and updated as appropriate: allergies, current medications, past family history, past medical history, past social history, past surgical history and problem list.    Review of Systems   Constitutional:  Negative for chills, fatigue and fever.   Respiratory:  Negative for cough, chest tightness, shortness of breath and wheezing.    Cardiovascular:  Negative for chest pain, palpitations and leg swelling.   Neurological:  Negative for dizziness and headache.   Hematological: Negative.    Psychiatric/Behavioral: Negative.  Negative for sleep disturbance.        Objective   Physical Exam  Vitals and nursing note reviewed.   Constitutional:       Appearance: She is well-developed.   HENT:      Head: Normocephalic  and atraumatic.   Eyes:      Conjunctiva/sclera: Conjunctivae normal.      Pupils: Pupils are equal, round, and reactive to light.   Cardiovascular:      Rate and Rhythm: Normal rate and regular rhythm.      Heart sounds: Normal heart sounds. No murmur heard.  Pulmonary:      Effort: Pulmonary effort is normal.      Breath sounds: Normal breath sounds.   Neurological:      Mental Status: She is alert and oriented to person, place, and time.   Psychiatric:         Behavior: Behavior normal.         Thought Content: Thought content normal.         Judgment: Judgment normal.         Vitals:    12/06/24 1052   BP: 132/84   Pulse: 87   Resp: 18   SpO2: 97%     Body mass index is 25.39 kg/m².      Procedures    Assessment & Plan   Problems Addressed this Visit    None  Visit Diagnoses       Encounter for weight loss counseling    -  Primary    Relevant Orders    Comprehensive Metabolic Panel    TSH    T3, Free    T4, Free    Screening for hyperlipidemia        Relevant Orders    Lipid Panel With LDL/HDL Ratio          Diagnoses         Codes Comments    Encounter for weight loss counseling    -  Primary ICD-10-CM: Z71.3  ICD-9-CM: V65.3     Screening for hyperlipidemia     ICD-10-CM: Z13.220  ICD-9-CM: V77.91             Assessment & Plan  1. Weight management.  Her BMI is currently within the normal range at 25.39, which does not meet the criteria for semaglutide treatment. She was previously at a BMI of 30 during her last visit. She was advised against the use of semaglutide due to her current BMI. Instead, a traditional approach to weight loss through diet and exercise was recommended. She was informed about the potential side effects of semaglutide, including nausea, vomiting, severe abdominal pain, and constipation. She was also cautioned about the risk of pancreatitis associated with semaglutide use.    2. Health maintenance.  A comprehensive metabolic panel (CMP) will be ordered to assess her kidney, liver, and  thyroid function. She was advised to monitor for any symptoms such as abdominal pain, severe nausea, or vomiting, and to report any such symptoms immediately.       Assessment & Plan  Encounter for weight loss counseling    Orders:    Comprehensive Metabolic Panel    TSH    T3, Free    T4, Free    Screening for hyperlipidemia    Orders:    Lipid Panel With LDL/HDL Ratio           Return if symptoms worsen or fail to improve.    Patient or patient representative verbalized consent for the use of Ambient Listening during the visit with  JERAD Abraham for chart documentation. 12/6/2024  11:21 EST

## 2024-12-07 LAB
ALBUMIN SERPL-MCNC: 4.5 G/DL (ref 4–5)
ALP SERPL-CCNC: 137 IU/L (ref 44–121)
ALT SERPL-CCNC: 15 IU/L (ref 0–32)
AST SERPL-CCNC: 17 IU/L (ref 0–40)
BILIRUB SERPL-MCNC: 0.6 MG/DL (ref 0–1.2)
BUN SERPL-MCNC: 8 MG/DL (ref 6–20)
BUN/CREAT SERPL: 10 (ref 9–23)
CALCIUM SERPL-MCNC: 9.9 MG/DL (ref 8.7–10.2)
CHLORIDE SERPL-SCNC: 104 MMOL/L (ref 96–106)
CHOLEST SERPL-MCNC: 224 MG/DL (ref 100–199)
CO2 SERPL-SCNC: 25 MMOL/L (ref 20–29)
CREAT SERPL-MCNC: 0.79 MG/DL (ref 0.57–1)
EGFRCR SERPLBLD CKD-EPI 2021: 106 ML/MIN/1.73
GLOBULIN SER CALC-MCNC: 2.7 G/DL (ref 1.5–4.5)
GLUCOSE SERPL-MCNC: 76 MG/DL (ref 70–99)
HDLC SERPL-MCNC: 42 MG/DL
LDLC SERPL CALC-MCNC: 148 MG/DL (ref 0–99)
LDLC/HDLC SERPL: 3.5 RATIO (ref 0–3.2)
POTASSIUM SERPL-SCNC: 4 MMOL/L (ref 3.5–5.2)
PROT SERPL-MCNC: 7.2 G/DL (ref 6–8.5)
SODIUM SERPL-SCNC: 142 MMOL/L (ref 134–144)
T3FREE SERPL-MCNC: 3.3 PG/ML (ref 2–4.4)
T4 FREE SERPL-MCNC: 1.24 NG/DL (ref 0.82–1.77)
TRIGL SERPL-MCNC: 185 MG/DL (ref 0–149)
TSH SERPL DL<=0.005 MIU/L-ACNC: 0.99 UIU/ML (ref 0.45–4.5)
VLDLC SERPL CALC-MCNC: 34 MG/DL (ref 5–40)

## 2025-02-17 RX ORDER — CIPROFLOXACIN 500 MG/1
500 TABLET, FILM COATED ORAL 2 TIMES DAILY
Qty: 6 TABLET | Refills: 0 | Status: SHIPPED | OUTPATIENT
Start: 2025-02-17

## 2025-02-17 RX ORDER — CIPROFLOXACIN 500 MG/1
500 TABLET, FILM COATED ORAL 2 TIMES DAILY
Qty: 6 TABLET | Refills: 0 | Status: SHIPPED | OUTPATIENT
Start: 2025-02-17 | End: 2025-02-17 | Stop reason: SDUPTHER

## 2025-07-10 ENCOUNTER — OFFICE VISIT (OUTPATIENT)
Dept: FAMILY MEDICINE CLINIC | Facility: CLINIC | Age: 25
End: 2025-07-10
Payer: COMMERCIAL

## 2025-07-10 VITALS
BODY MASS INDEX: 26.31 KG/M2 | SYSTOLIC BLOOD PRESSURE: 100 MMHG | WEIGHT: 134 LBS | DIASTOLIC BLOOD PRESSURE: 74 MMHG | HEART RATE: 79 BPM | OXYGEN SATURATION: 98 % | HEIGHT: 60 IN | RESPIRATION RATE: 18 BRPM

## 2025-07-10 DIAGNOSIS — Z86.2 HISTORY OF ANEMIA: ICD-10-CM

## 2025-07-10 DIAGNOSIS — A60.00 GENITAL HERPES SIMPLEX, UNSPECIFIED SITE: ICD-10-CM

## 2025-07-10 DIAGNOSIS — R53.83 OTHER FATIGUE: ICD-10-CM

## 2025-07-10 DIAGNOSIS — R21 RASH: ICD-10-CM

## 2025-07-10 DIAGNOSIS — Z76.89 ENCOUNTER FOR WEIGHT MANAGEMENT: ICD-10-CM

## 2025-07-10 DIAGNOSIS — Z11.3 SCREENING EXAMINATION FOR STI: Primary | ICD-10-CM

## 2025-07-10 PROCEDURE — 99214 OFFICE O/P EST MOD 30 MIN: CPT | Performed by: NURSE PRACTITIONER

## 2025-07-10 NOTE — PROGRESS NOTES
Subjective   Masha Shafer is a 24 y.o. female.     Chief Complaint   Patient presents with    std screening        History of Present Illness     History of Present Illness  The patient presents for STD screening, fatigue, weight management, and foot sensitivity.    She has recently entered a new relationship and is seeking testing for sexually transmitted diseases. She suspects a possible bacterial vaginosis or yeast infection due to symptoms of mild burning sensation, urinary frequency, and an unusual odor. She has been using boric acid and is uncertain if the discharge she experiences is a side effect of this treatment. She is unsure about the presence of genital sores and does not believe she is currently experiencing an outbreak. She has a history of childbirth and herpes but has not had frequent outbreaks or significant symptoms. She recalls one instance of a sore but has not experienced severe burning or pain sensations recently, only mild irritation.    She also reports persistent fatigue and poor sleep quality since the birth of her child. Her last labs indicated anemia in 2023, and she has not had her labs rechecked since then.    She was previously on Ozempic, which she discontinued due to minimal weight loss. She lost approximately 15 pounds while on the medication. She resumed the medication after a month and noticed further weight loss. However, she experienced severe stomach pain, cramps, and blurred vision over the past two weeks, which led her to stop the medication again. She is concerned about potential side effects and is hesitant to continue the medication.    She reports sensitivity in her foot, describing it as tingly and burning, but notes no visible signs of fungus or rash. She reports no recent injuries.       The following portions of the patient's history were reviewed and updated as appropriate: allergies, current medications, past family history, past medical history, past social  history, past surgical history and problem list.    Review of Systems   Constitutional:  Positive for fatigue. Negative for chills and fever.   Respiratory:  Negative for cough, chest tightness, shortness of breath and wheezing.    Cardiovascular:  Negative for chest pain, palpitations and leg swelling.   Genitourinary:  Positive for vaginal discharge. Negative for dysuria, flank pain, frequency, genital sores, hematuria, pelvic pain, pelvic pressure and vaginal bleeding.   Skin:  Positive for rash.   Neurological:  Negative for dizziness, weakness and headache.   Hematological: Negative.    Psychiatric/Behavioral:  Negative for agitation, behavioral problems and hallucinations.        Objective   Physical Exam  Vitals and nursing note reviewed.   Constitutional:       Appearance: Normal appearance. She is well-developed.   HENT:      Head: Normocephalic and atraumatic.      Right Ear: External ear normal.      Left Ear: External ear normal.      Nose: Nose normal.   Eyes:      Conjunctiva/sclera: Conjunctivae normal.      Pupils: Pupils are equal, round, and reactive to light.   Neck:      Thyroid: No thyromegaly.   Cardiovascular:      Rate and Rhythm: Normal rate and regular rhythm.      Heart sounds: Normal heart sounds. No murmur heard.  Pulmonary:      Effort: Pulmonary effort is normal.      Breath sounds: Normal breath sounds.   Abdominal:      General: Bowel sounds are normal. There is no distension.      Palpations: Abdomen is soft.      Tenderness: There is no abdominal tenderness.   Musculoskeletal:         General: No deformity. Normal range of motion.      Cervical back: Normal range of motion and neck supple.   Lymphadenopathy:      Cervical: No cervical adenopathy.   Skin:     General: Skin is warm and dry.      Findings: Rash present. Rash is papular.      Comments: Rash to chest abdomen and back.    Neurological:      Mental Status: She is alert and oriented to person, place, and time.      Cranial  Nerves: No cranial nerve deficit.   Psychiatric:         Behavior: Behavior normal.         Thought Content: Thought content normal.         Judgment: Judgment normal.         Vitals:    07/10/25 1059   BP: 100/74   Pulse: 79   Resp: 18   SpO2: 98%     Body mass index is 26.17 kg/m².      Procedures    Assessment & Plan   Problems Addressed this Visit    None  Visit Diagnoses         Screening examination for STI    -  Primary    Relevant Orders    Chlamydia trachomatis, Neisseria gonorrhoeae, PCR - , Urine, Clean Catch    NuSwab BV & Candida - Swab, Vagina    HIV-1 / O / 2 Ag / Antibody    RPR, Rfx Qn RPR / Confirm TP      Other fatigue        Relevant Orders    CBC & Differential    Iron    Vitamin B12 and Folate    Ferritin      History of anemia        Relevant Orders    CBC & Differential    Iron    Vitamin B12 and Folate    Ferritin      Encounter for weight management              Diagnoses         Codes Comments      Screening examination for STI    -  Primary ICD-10-CM: Z11.3  ICD-9-CM: V74.5       Other fatigue     ICD-10-CM: R53.83  ICD-9-CM: 780.79       History of anemia     ICD-10-CM: Z86.2  ICD-9-CM: V12.3       Encounter for weight management     ICD-10-CM: Z76.89  ICD-9-CM: V65.49             Assessment & Plan  1. Suspected bacterial vaginosis.  - Reports a burning sensation, urinary frequency, and an odor, which are consistent with BV.  - A vaginal swab will be obtained for further analysis.  - Discussed symptoms and differential diagnosis.  - Vaginal swab ordered for further evaluation.    2. STI screening  - Reports a burning sensation and mild irritation, which could be indicative of a yeast infection.  - A vaginal swab will be obtained for further analysis.  - Discussed symptoms and differential diagnosis.  - Vaginal swab ordered for further evaluation.    3. Herpes simplex virus.  - History of herpes with rare outbreaks.  - Advised that antiviral medication is not indicated unless experiencing  monthly outbreaks.  - Informed about symptoms of an outbreak, including malaise, fatigue, and localized pain, and advised to avoid sexual activity during an outbreak to prevent transmission.  - No antiviral medication prescribed at this time.    4. Fatigue.  - Reports persistent fatigue and poor sleep since having her baby.  - Last labs were normal except for anemia in 2023.  - Advised to take a multivitamin.  - Blood work will be conducted to check for anemia and other potential causes of fatigue.    5. Weight management.  - Stopped taking Ozempic due to side effects, including stomach pain and blurry vision.  - Informed that she no longer qualifies for Ozempic.  - Advised to get her eyes checked due to the episode of blurry vision.  - No further Ozempic prescribed.                Return if symptoms worsen or fail to improve.    Patient or patient representative verbalized consent for the use of Ambient Listening during the visit with  JERAD Abraham for chart documentation. 7/10/2025  11:08 EDT

## 2025-07-11 LAB
BASOPHILS # BLD AUTO: 0.1 X10E3/UL (ref 0–0.2)
BASOPHILS NFR BLD AUTO: 1 %
EOSINOPHIL # BLD AUTO: 0.1 X10E3/UL (ref 0–0.4)
EOSINOPHIL NFR BLD AUTO: 1 %
ERYTHROCYTE [DISTWIDTH] IN BLOOD BY AUTOMATED COUNT: 12.1 % (ref 11.7–15.4)
FERRITIN SERPL-MCNC: 52 NG/ML (ref 15–150)
FOLATE SERPL-MCNC: 12.2 NG/ML
HCT VFR BLD AUTO: 40.4 % (ref 34–46.6)
HGB BLD-MCNC: 13.5 G/DL (ref 11.1–15.9)
HIV 1+2 AB+HIV1 P24 AG SERPL QL IA: NON REACTIVE
IMM GRANULOCYTES # BLD AUTO: 0 X10E3/UL (ref 0–0.1)
IMM GRANULOCYTES NFR BLD AUTO: 0 %
IRON SERPL-MCNC: 61 UG/DL (ref 27–159)
LYMPHOCYTES # BLD AUTO: 2.2 X10E3/UL (ref 0.7–3.1)
LYMPHOCYTES NFR BLD AUTO: 27 %
MCH RBC QN AUTO: 30.1 PG (ref 26.6–33)
MCHC RBC AUTO-ENTMCNC: 33.4 G/DL (ref 31.5–35.7)
MCV RBC AUTO: 90 FL (ref 79–97)
MONOCYTES # BLD AUTO: 0.5 X10E3/UL (ref 0.1–0.9)
MONOCYTES NFR BLD AUTO: 6 %
NEUTROPHILS # BLD AUTO: 5.3 X10E3/UL (ref 1.4–7)
NEUTROPHILS NFR BLD AUTO: 65 %
OBSERVATION IMP: NORMAL
PLATELET # BLD AUTO: 257 X10E3/UL (ref 150–450)
RBC # BLD AUTO: 4.48 X10E6/UL (ref 3.77–5.28)
RPR SER QL: NON REACTIVE
VIT B12 SERPL-MCNC: 1071 PG/ML (ref 232–1245)
WBC # BLD AUTO: 8.1 X10E3/UL (ref 3.4–10.8)

## 2025-07-14 LAB
C TRACH RRNA SPEC QL NAA+PROBE: NEGATIVE
N GONORRHOEA RRNA SPEC QL NAA+PROBE: NEGATIVE

## 2025-07-21 DIAGNOSIS — B37.9 YEAST INFECTION: Primary | ICD-10-CM

## 2025-07-21 RX ORDER — FLUCONAZOLE 150 MG/1
150 TABLET ORAL ONCE
Qty: 1 TABLET | Refills: 0 | Status: SHIPPED | OUTPATIENT
Start: 2025-07-21 | End: 2025-07-22

## 2025-08-07 ENCOUNTER — OFFICE VISIT (OUTPATIENT)
Dept: OBSTETRICS AND GYNECOLOGY | Facility: CLINIC | Age: 25
End: 2025-08-07
Payer: COMMERCIAL

## 2025-08-07 VITALS — BODY MASS INDEX: 26.76 KG/M2 | SYSTOLIC BLOOD PRESSURE: 105 MMHG | DIASTOLIC BLOOD PRESSURE: 65 MMHG | WEIGHT: 137 LBS

## 2025-08-07 DIAGNOSIS — N76.0 BV (BACTERIAL VAGINOSIS): ICD-10-CM

## 2025-08-07 DIAGNOSIS — Z97.5 IUD (INTRAUTERINE DEVICE) IN PLACE: ICD-10-CM

## 2025-08-07 DIAGNOSIS — Z01.419 ENCOUNTER FOR WELL WOMAN EXAM WITH ROUTINE GYNECOLOGICAL EXAM: Primary | ICD-10-CM

## 2025-08-07 DIAGNOSIS — B96.89 BV (BACTERIAL VAGINOSIS): ICD-10-CM

## 2025-08-07 RX ORDER — METRONIDAZOLE 500 MG/1
500 TABLET ORAL 2 TIMES DAILY
Qty: 14 TABLET | Refills: 0 | Status: SHIPPED | OUTPATIENT
Start: 2025-08-07 | End: 2025-08-14

## 2025-08-07 RX ORDER — CLINDAMYCIN PHOSPHATE 10 MG/ML
SOLUTION TOPICAL
COMMUNITY
Start: 2025-07-30

## 2025-08-07 RX ORDER — TRETINOIN 0.25 MG/G
CREAM TOPICAL
COMMUNITY
Start: 2025-07-30

## 2025-08-07 RX ORDER — FLUCONAZOLE 150 MG/1
150 TABLET ORAL
Qty: 2 TABLET | Refills: 0 | Status: SHIPPED | OUTPATIENT
Start: 2025-08-07

## 2025-08-07 RX ORDER — SPIRONOLACTONE 50 MG/1
2 TABLET, FILM COATED ORAL DAILY
COMMUNITY
Start: 2025-07-30

## 2025-08-07 RX ORDER — MINOCYCLINE HYDROCHLORIDE 100 MG/1
CAPSULE ORAL
COMMUNITY
Start: 2025-07-30